# Patient Record
Sex: MALE | Race: WHITE | NOT HISPANIC OR LATINO | Employment: FULL TIME | URBAN - METROPOLITAN AREA
[De-identification: names, ages, dates, MRNs, and addresses within clinical notes are randomized per-mention and may not be internally consistent; named-entity substitution may affect disease eponyms.]

---

## 2017-01-03 ENCOUNTER — TELEPHONE (OUTPATIENT)
Dept: INTERNAL MEDICINE | Facility: CLINIC | Age: 37
End: 2017-01-03

## 2017-01-03 NOTE — TELEPHONE ENCOUNTER
Pt calling b/p readings.  130/90, 124/82, 130/84, 128/83.  Left message for pt that per TGF - all good.  Continue to log b/p readings.

## 2017-01-03 NOTE — TELEPHONE ENCOUNTER
----- Message from Sebastien Shearer sent at 1/3/2017 11:37 AM EST -----  Contact: Leon  BP readings:  130/90, 124/82, 130/84, 126/84 and 128/83.  Leon [phone]

## 2017-02-07 ENCOUNTER — OFFICE VISIT (OUTPATIENT)
Dept: PSYCHIATRY | Facility: CLINIC | Age: 37
End: 2017-02-07

## 2017-02-07 DIAGNOSIS — F43.23 ADJUSTMENT DISORDER WITH MIXED ANXIETY AND DEPRESSED MOOD: Primary | ICD-10-CM

## 2017-02-07 DIAGNOSIS — R45.4 ANGER: ICD-10-CM

## 2017-02-07 PROCEDURE — 90834 PSYTX W PT 45 MINUTES: CPT | Performed by: PSYCHOLOGIST

## 2017-02-07 NOTE — PROGRESS NOTES
Psych Progress Note    Leon Carnes is 37 y.o. male     1. Description of Session    The pt's chief complaints were feelings of anxiety, feeling overwhelmed with responsibilities, and having some resentful feelings towards his mother. He talked about how he struggles to not snap at his kids, but he is doing better. Since the last session he has gotten more one-on-one time with his wife and has gotten additional help from his mother in order to for he and his wife to get a break. He talked about anger/frustrations with his mother who he believes may not have done all she could to help his father stay alive (e.g., pulling the feeding tube too soon). The therapist helped the pt see how unanswered questions about his father's death may be leading him to have anger towards his mother, but talking this out with her at some point may help put some feelings to rest. Also, the therapist helped the pt to see how his mother likely wants to help with the kids more and that he is not likely burdening her to get this help. Role playing was conducted in order to help him see how he can better express himself and help him ask for help more often and in turn, feel less stressed. Perspective taking was taken in order to provide examples of how he can help himself de-stress.     2. Assessment/Diagnostic Impression    The pt presents as anxious, likely due to worrying about things without knowing the proper actions to help himself feel less anxious/overwhelmed. He is making progress as he learns new coping strategies and asks for help more often.     3. Treatment Plan    Continue to provide perspective taking exercises that he can use to help himself de-stress outside of session. Help him notice progress in his life as encouragement and motivation in order to continue working towards a better work-life balance in his life.

## 2017-02-28 ENCOUNTER — OFFICE VISIT (OUTPATIENT)
Dept: PSYCHIATRY | Facility: CLINIC | Age: 37
End: 2017-02-28

## 2017-02-28 DIAGNOSIS — R45.4 ANGER: Primary | ICD-10-CM

## 2017-02-28 DIAGNOSIS — F43.21 ADJUSTMENT DISORDER WITH DEPRESSED MOOD: ICD-10-CM

## 2017-02-28 PROCEDURE — 90834 PSYTX W PT 45 MINUTES: CPT | Performed by: PSYCHOLOGIST

## 2017-03-22 ENCOUNTER — OFFICE VISIT (OUTPATIENT)
Dept: INTERNAL MEDICINE | Facility: CLINIC | Age: 37
End: 2017-03-22

## 2017-03-22 VITALS
TEMPERATURE: 96.8 F | OXYGEN SATURATION: 97 % | HEART RATE: 64 BPM | RESPIRATION RATE: 16 BRPM | SYSTOLIC BLOOD PRESSURE: 130 MMHG | DIASTOLIC BLOOD PRESSURE: 100 MMHG | BODY MASS INDEX: 28.37 KG/M2 | WEIGHT: 215 LBS

## 2017-03-22 DIAGNOSIS — F41.9 ANXIETY: Primary | ICD-10-CM

## 2017-03-22 DIAGNOSIS — R03.0 PREHYPERTENSION: ICD-10-CM

## 2017-03-22 DIAGNOSIS — L29.0 RECTAL ITCHING: ICD-10-CM

## 2017-03-22 DIAGNOSIS — J30.89 PERENNIAL ALLERGIC RHINITIS, UNSPECIFIED ALLERGIC RHINITIS TRIGGER: ICD-10-CM

## 2017-03-22 PROCEDURE — 99213 OFFICE O/P EST LOW 20 MIN: CPT | Performed by: INTERNAL MEDICINE

## 2017-03-22 NOTE — PATIENT INSTRUCTIONS
1.  Consider Anusol HC cream - to treat rectal itchiness.    2.  Consider melatonin 3 mg up to 9 mg at bedtime - to improve sleep quality.  Long-term goal - 7 hours to 8 hours nightly.    3.  Continue routine work with clinical psychologist - to improve stress management.  Consider cognitive behavioral therapy - to improve sleep.    4.  Consider work with registered dietitian - improve nutrition - bring weight down to 200 pounds.    5.  Check blood pressure once weekly or once monthly - on a permanent basis - to monitor changes.    6.  Return in December - annual checkup fasting.

## 2017-03-22 NOTE — PROGRESS NOTES
Cristy Carnes is a 37 y.o. male.     History of Present Illness     The patient's complained of recurring stress and anxiety over the last year.  He has stable employment in a stable family life with his wife and 2 children.  He finds the children often get on his nose and he becomes stressed and at times angry.  He has been working with a clinical psychologist and feels this has helped him to gain more insight and controlling his emotions.  He has not used medication for stress disorders.  His sleep is often broken and inadequate.      The following portions of the patient's history were reviewed and updated as appropriate: allergies, current medications, past family history, past medical history, past social history, past surgical history and problem list.    Review of Systems   Constitutional: Negative for appetite change and fatigue.   Respiratory: Negative for cough and shortness of breath.    Cardiovascular: Negative for chest pain and palpitations.   Gastrointestinal: Negative for abdominal distention, anal bleeding, constipation, diarrhea and nausea.        Intermittent rectal itching for several months   Neurological: Negative for dizziness and light-headedness.   Psychiatric/Behavioral: Positive for sleep disturbance. Negative for dysphoric mood. The patient is nervous/anxious.        Objective   Blood pressure 130/100, pulse 64, temperature 96.8 °F (36 °C), temperature source Oral, resp. rate 16, weight 215 lb (97.5 kg), SpO2 97 %.    Physical Exam   Constitutional: He is oriented to person, place, and time. He appears well-developed and well-nourished. No distress.   Cardiovascular: Normal rate, regular rhythm and normal heart sounds.    Pulmonary/Chest: Effort normal and breath sounds normal. He has no wheezes. He has no rales.   Neurological: He is alert and oriented to person, place, and time. He exhibits normal muscle tone. Coordination normal.   Psychiatric: He has a normal mood and  affect. His behavior is normal. Judgment and thought content normal.   Nursing note and vitals reviewed.    Procedures  Assessment/Plan   Leon was seen today for anxiety.    Diagnoses and all orders for this visit:    Anxiety    Perennial allergic rhinitis, unspecified allergic rhinitis trigger    Rectal itching    Prehypertension     The patient appears to be undergoing an adjustment reaction of young adulthood.  I've asked him to stay engaged with a clinical psychologist.  I've asked him to continue regular physical fitness.    The patient's sleep is somewhat broken likely related to internal stressors.  I've counseled him on considering melatonin as a starting point for treatment.    The patient complains of rectal itching in recent months.  He has talked to her friend who was diagnosed pinworms and prescribed albendazole.  I've encouraged him to finish these 2 pills and assess his progress.  He may need a visit with a proctologist.    The patient's had a history of elevated blood pressures now for the last 3 years.  Blood pressure readings at home have been averaging 130/85.  He is brought in multiple readings in today.  I reminded him about salt restriction, aerobic activity, and weight control.  A goal weight below 200 pounds would  be protective.    Patient Instructions   1.  Consider Anusol HC cream - to treat rectal itchiness.    2.  Consider melatonin 3 mg up to 9 mg at bedtime - to improve sleep quality.  Long-term goal - 7 hours to 8 hours nightly.    3.  Continue routine work with clinical psychologist - to improve stress management.  Consider cognitive behavioral therapy - to improve sleep.    4.  Consider work with registered dietitian - improve nutrition - bring weight down to 200 pounds.    5.  Check blood pressure once weekly or once monthly - on a permanent basis - to monitor changes.    6.  Return in December - annual checkup fasting.      Electronically signed Emeka Laurent M.D.3/24/2017  7:06 AM

## 2017-04-03 NOTE — PROGRESS NOTES
Psych Progress Note    Leon Carnes is 37 y.o. male     1. Description of Session    The pt's chief complaints were feeling stressed, snapping on his kids (then feeling guilty), and feeling somewhat disconnected with his wife. Other areas of his life were reported as going pretty well (e.g., work). Education about the importance of him communicating with his wife regularly one-on-one and being on the same page was provided. The pt was able to note how the two of them need to talk more and share responsibilities. Also, he was able to see how if the two of them are not on the same page, everything else is difficult, if not stressful (and he snaps on the kids). Education (and practice in role play) was provided in order to help the pt connect more with his wife. Love languages were discussed and homework was assigned for him and his wife to discuss their type of love language in order to connect on a deeper level (touch, quality time, acts of service, etc.). He expressed gratitude for today's session.    2. Assessment/Diagnostic Impression    The pt struggles to adjust to his busy life with two kids, keeping up with work, and staying connected with his wife. He tends to take on too many things and needs to share more responsibilities with his wife but only after the two of them spend more time together, connect, and get on the same page with family issues. He blames himself for getting angry and worries that he will be like his father who yelled at the kids. He is making some progress as he is learning better communication skills. He does express sadness over the loss of his father and may be downplaying this more than he first lets on.    3. Treatment Plan    Follow up with assigned homework. Provide grief therapy as needed/appropriate.

## 2018-02-22 ENCOUNTER — TELEPHONE (OUTPATIENT)
Dept: INTERNAL MEDICINE | Facility: CLINIC | Age: 38
End: 2018-02-22

## 2018-02-22 NOTE — TELEPHONE ENCOUNTER
QUINTON PAREDES SAID HE WAS SUPPOSE TO GO TO HIS HEART DOCTOR LAST YEAR FOR JUST A FOLLOW UP  AND IT SLIPPED THRU THE CRACK AND NOW TRYING TO GET TO SEE HIM -HE IS NOW CONSIDERED A NEW PATIENT  AND NEEDS A RE FERAL - NOTHING WRONG AT ALL WAS JUST SUPPOSED TO FOLLOW UP AND DIDN'T     FAMILY HISTORY OF HEART ISSUES (HIS FATHER)     DR NOBLES IS HIS CARDIOLOGIST

## 2018-03-08 ENCOUNTER — OFFICE VISIT (OUTPATIENT)
Dept: INTERNAL MEDICINE | Facility: CLINIC | Age: 38
End: 2018-03-08

## 2018-03-08 ENCOUNTER — APPOINTMENT (OUTPATIENT)
Dept: LAB | Facility: HOSPITAL | Age: 38
End: 2018-03-08

## 2018-03-08 VITALS
RESPIRATION RATE: 16 BRPM | BODY MASS INDEX: 27.57 KG/M2 | SYSTOLIC BLOOD PRESSURE: 136 MMHG | WEIGHT: 208 LBS | DIASTOLIC BLOOD PRESSURE: 94 MMHG | HEART RATE: 48 BPM | TEMPERATURE: 98.4 F | OXYGEN SATURATION: 98 % | HEIGHT: 73 IN

## 2018-03-08 DIAGNOSIS — E78.00 PURE HYPERCHOLESTEROLEMIA: ICD-10-CM

## 2018-03-08 DIAGNOSIS — E55.9 VITAMIN D DEFICIENCY: ICD-10-CM

## 2018-03-08 DIAGNOSIS — R53.82 CHRONIC FATIGUE: ICD-10-CM

## 2018-03-08 DIAGNOSIS — I47.1 PAT (PAROXYSMAL ATRIAL TACHYCARDIA) (HCC): Primary | ICD-10-CM

## 2018-03-08 DIAGNOSIS — F41.9 ANXIETY: ICD-10-CM

## 2018-03-08 DIAGNOSIS — Z23 IMMUNIZATION DUE: ICD-10-CM

## 2018-03-08 DIAGNOSIS — J30.89 PERENNIAL ALLERGIC RHINITIS: ICD-10-CM

## 2018-03-08 DIAGNOSIS — Z00.00 PREVENTATIVE HEALTH CARE: ICD-10-CM

## 2018-03-08 DIAGNOSIS — R03.0 PREHYPERTENSION: ICD-10-CM

## 2018-03-08 PROBLEM — L29.0 RECTAL ITCHING: Status: RESOLVED | Noted: 2017-03-22 | Resolved: 2018-03-08

## 2018-03-08 PROBLEM — E78.5 HLD (HYPERLIPIDEMIA): Status: ACTIVE | Noted: 2018-03-08

## 2018-03-08 LAB
25(OH)D3 SERPL-MCNC: 39.8 NG/ML
ALBUMIN SERPL-MCNC: 5.1 G/DL (ref 3.2–4.8)
ALBUMIN/GLOB SERPL: 1.9 G/DL (ref 1.5–2.5)
ALP SERPL-CCNC: 23 U/L (ref 25–100)
ALT SERPL W P-5'-P-CCNC: 20 U/L (ref 7–40)
ANION GAP SERPL CALCULATED.3IONS-SCNC: 8 MMOL/L (ref 3–11)
ARTICHOKE IGE QN: 141 MG/DL (ref 0–130)
AST SERPL-CCNC: 22 U/L (ref 0–33)
BASOPHILS # BLD AUTO: 0.04 10*3/MM3 (ref 0–0.2)
BASOPHILS NFR BLD AUTO: 0.7 % (ref 0–1)
BILIRUB SERPL-MCNC: 0.9 MG/DL (ref 0.3–1.2)
BILIRUB UR QL STRIP: NEGATIVE
BUN BLD-MCNC: 12 MG/DL (ref 9–23)
BUN/CREAT SERPL: 12 (ref 7–25)
CALCIUM SPEC-SCNC: 9.7 MG/DL (ref 8.7–10.4)
CHLORIDE SERPL-SCNC: 106 MMOL/L (ref 99–109)
CHOLEST SERPL-MCNC: 193 MG/DL (ref 0–200)
CLARITY UR: CLEAR
CO2 SERPL-SCNC: 27 MMOL/L (ref 20–31)
COLOR UR: YELLOW
CREAT BLD-MCNC: 1 MG/DL (ref 0.6–1.3)
CRP SERPL-MCNC: 0.17 MG/DL (ref 0–1)
DEPRECATED RDW RBC AUTO: 39.5 FL (ref 37–54)
EOSINOPHIL # BLD AUTO: 0.11 10*3/MM3 (ref 0–0.3)
EOSINOPHIL NFR BLD AUTO: 1.9 % (ref 0–3)
ERYTHROCYTE [DISTWIDTH] IN BLOOD BY AUTOMATED COUNT: 12.4 % (ref 11.3–14.5)
GFR SERPL CREATININE-BSD FRML MDRD: 85 ML/MIN/1.73
GLOBULIN UR ELPH-MCNC: 2.7 GM/DL
GLUCOSE BLD-MCNC: 72 MG/DL (ref 70–100)
GLUCOSE UR STRIP-MCNC: NEGATIVE MG/DL
HCT VFR BLD AUTO: 45.3 % (ref 38.9–50.9)
HDLC SERPL-MCNC: 67 MG/DL (ref 40–60)
HGB BLD-MCNC: 15.3 G/DL (ref 13.1–17.5)
HGB UR QL STRIP.AUTO: NEGATIVE
IMM GRANULOCYTES # BLD: 0.02 10*3/MM3 (ref 0–0.03)
IMM GRANULOCYTES NFR BLD: 0.3 % (ref 0–0.6)
KETONES UR QL STRIP: ABNORMAL
LEUKOCYTE ESTERASE UR QL STRIP.AUTO: NEGATIVE
LYMPHOCYTES # BLD AUTO: 2.11 10*3/MM3 (ref 0.6–4.8)
LYMPHOCYTES NFR BLD AUTO: 36.2 % (ref 24–44)
MCH RBC QN AUTO: 29.5 PG (ref 27–31)
MCHC RBC AUTO-ENTMCNC: 33.8 G/DL (ref 32–36)
MCV RBC AUTO: 87.5 FL (ref 80–99)
MONOCYTES # BLD AUTO: 0.42 10*3/MM3 (ref 0–1)
MONOCYTES NFR BLD AUTO: 7.2 % (ref 0–12)
NEUTROPHILS # BLD AUTO: 3.13 10*3/MM3 (ref 1.5–8.3)
NEUTROPHILS NFR BLD AUTO: 53.7 % (ref 41–71)
NITRITE UR QL STRIP: NEGATIVE
PH UR STRIP.AUTO: <=5 [PH] (ref 5–8)
PLATELET # BLD AUTO: 196 10*3/MM3 (ref 150–450)
PMV BLD AUTO: 10.6 FL (ref 6–12)
POTASSIUM BLD-SCNC: 4.1 MMOL/L (ref 3.5–5.5)
PROT SERPL-MCNC: 7.8 G/DL (ref 5.7–8.2)
PROT UR QL STRIP: NEGATIVE
RBC # BLD AUTO: 5.18 10*6/MM3 (ref 4.2–5.76)
SODIUM BLD-SCNC: 141 MMOL/L (ref 132–146)
SP GR UR STRIP: 1.01 (ref 1–1.03)
TRIGL SERPL-MCNC: 62 MG/DL (ref 0–150)
TSH SERPL DL<=0.05 MIU/L-ACNC: 1.25 MIU/ML (ref 0.35–5.35)
UROBILINOGEN UR QL STRIP: ABNORMAL
WBC NRBC COR # BLD: 5.83 10*3/MM3 (ref 3.5–10.8)

## 2018-03-08 PROCEDURE — 90715 TDAP VACCINE 7 YRS/> IM: CPT | Performed by: INTERNAL MEDICINE

## 2018-03-08 PROCEDURE — 99395 PREV VISIT EST AGE 18-39: CPT | Performed by: INTERNAL MEDICINE

## 2018-03-08 PROCEDURE — 84443 ASSAY THYROID STIM HORMONE: CPT | Performed by: INTERNAL MEDICINE

## 2018-03-08 PROCEDURE — 86140 C-REACTIVE PROTEIN: CPT | Performed by: INTERNAL MEDICINE

## 2018-03-08 PROCEDURE — 80061 LIPID PANEL: CPT | Performed by: INTERNAL MEDICINE

## 2018-03-08 PROCEDURE — 80053 COMPREHEN METABOLIC PANEL: CPT | Performed by: INTERNAL MEDICINE

## 2018-03-08 PROCEDURE — 82306 VITAMIN D 25 HYDROXY: CPT | Performed by: INTERNAL MEDICINE

## 2018-03-08 PROCEDURE — 85025 COMPLETE CBC W/AUTO DIFF WBC: CPT | Performed by: INTERNAL MEDICINE

## 2018-03-08 PROCEDURE — 36415 COLL VENOUS BLD VENIPUNCTURE: CPT | Performed by: INTERNAL MEDICINE

## 2018-03-08 PROCEDURE — 99214 OFFICE O/P EST MOD 30 MIN: CPT | Performed by: INTERNAL MEDICINE

## 2018-03-08 PROCEDURE — 93000 ELECTROCARDIOGRAM COMPLETE: CPT | Performed by: INTERNAL MEDICINE

## 2018-03-08 PROCEDURE — 81003 URINALYSIS AUTO W/O SCOPE: CPT | Performed by: INTERNAL MEDICINE

## 2018-03-08 PROCEDURE — 90471 IMMUNIZATION ADMIN: CPT | Performed by: INTERNAL MEDICINE

## 2018-03-08 RX ORDER — TRETINOIN 0.5 MG/G
1 CREAM TOPICAL DAILY PRN
Refills: 2 | COMMUNITY
Start: 2018-02-25 | End: 2019-01-19

## 2018-03-08 NOTE — PATIENT INSTRUCTIONS
1.  Continue usual medicines and supplements - as listed.    2.  Follow low calorie American Heart Association diet - low in salt and low in sugar.    3.  Routinely keep sodium intake - no more than - 2000 mg daily.    4.  Maintain routine physical fitness program - every week.    5.  Appointment with cardiologist - reassess heart rhythm status.    6.  Plan appointment with counselor - develop stress management techniques.    7.  Return visit one year - annual checkup fasting.

## 2018-03-08 NOTE — PROGRESS NOTES
Cristy Carnes is a 38 y.o. male.     Chief Complaint   Patient presents with   • Annual Exam   • Anxiety       History of Present Illness     The patient has noticed several years of increasing difficulty controlling his temper at home.  He is very aware when this occurs with frustration with his small children.  He is getting along  well at work and with his wife.  He did work with a clinical psychologist last year but found this minimally helpful.  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  He feels his work performance has been stable in the last year.  He is generally sleeping well at night.  He has had a more disciplined exercise program in recent months and finds this helps with stress management.  He has not been on medications for stress management.     The following portions of the patient's history were reviewed and updated as appropriate: allergies, current medications, past family history, past medical history, past social history, past surgical history and problem list.    Active Ambulatory Problems     Diagnosis Date Noted   • Preventative health care 2016   • Anxiety 2016   • Perennial allergic rhinitis 2016   • PAT (paroxysmal atrial tachycardia) 2016   • Chronic fatigue 2016   • Prehypertension 2017   • Vitamin D deficiency 2018   • HLD (hyperlipidemia) 2018     Resolved Ambulatory Problems     Diagnosis Date Noted   • Rectal itching 2017     Past Medical History:   Diagnosis Date   • Allergic rhinitis Lifelong   • Anxiety    • AV yudi re-entry tachycardia    • Closed left ankle fracture    • Hyperlipidemia    • Prehypertension      Past Surgical History:   Procedure Laterality Date   • RADIOFREQUENCY ABLATION      AV nod tach     Family History   Problem Relation Age of Onset   • No Known Problems Mother    • Hypertrophic cardiomyopathy Father       age 72   • Hypertension Father       Social History     Social History   • Marital status:      Spouse name: N/A   • Number of children: N/A   • Years of education: N/A     Occupational History   • Not on file.     Social History Main Topics   • Smoking status: Never Smoker   • Smokeless tobacco: Never Used   • Alcohol use 1.2 oz/week     2 Glasses of wine per week   • Drug use: No   • Sexual activity: Not on file     Other Topics Concern   • Not on file     Social History Narrative    Domestic life : Lives in private home with wife and 2 children        Sabianist : [employer]        Sleep hygiene : In bed 10 PM to 6 AM for 8 hours of sleep        Caffeine use : 1 or 2 cups of coffee daily        Exercise habits : Jogs 3 miles 4 days weekly.  200 pushups 3 days weekly.          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   HENT: Positive for congestion and sneezing. Negative for ear pain and sore throat.         Allergic rhinitis well controlled on nightly Zyrtec   Eyes: Negative for itching and visual disturbance.   Respiratory: Negative for cough and shortness of breath.    Cardiovascular: Negative for chest pain and palpitations.   Gastrointestinal: Negative for abdominal pain and nausea.   Endocrine: Negative for cold intolerance and heat intolerance.   Genitourinary: Negative for dysuria and hematuria.   Musculoskeletal: Negative for arthralgias and back pain.   Skin: Negative for rash and wound.   Allergic/Immunologic: Negative for environmental allergies and food allergies.   Neurological: Negative for dizziness and headaches.   Hematological: Negative for adenopathy. Does not bruise/bleed easily.   Psychiatric/Behavioral: Negative for sleep  "disturbance. The patient is nervous/anxious.         Seems well on Zyrtec.  Recurring episodes of anxiety and distress at home.  Functions well at work.       Objective   Blood pressure 136/94, pulse (!) 48, temperature 98.4 °F (36.9 °C), temperature source Oral, resp. rate 16, height 185.4 cm (73\"), weight 94.3 kg (208 lb), SpO2 98 %.    Physical Exam   Constitutional: He is oriented to person, place, and time. He appears well-developed and well-nourished. No distress.   HENT:   Right Ear: External ear normal.   Left Ear: External ear normal.   Nose: Nose normal.   Mouth/Throat: Oropharynx is clear and moist.   Eyes: EOM are normal. Pupils are equal, round, and reactive to light. No scleral icterus.   Neck: Normal range of motion. Neck supple. No JVD present. No thyromegaly present.   Cardiovascular: Normal rate, regular rhythm, normal heart sounds and intact distal pulses.    No murmur heard.  Pulmonary/Chest: Effort normal and breath sounds normal. He has no wheezes. He has no rales.   Abdominal: Soft. Bowel sounds are normal. He exhibits no distension and no mass. There is no tenderness.   Genitourinary:   Genitourinary Comments: Deferred   Musculoskeletal: Normal range of motion. He exhibits no edema or tenderness.   Lymphadenopathy:     He has no cervical adenopathy.   Neurological: He is alert and oriented to person, place, and time. He displays normal reflexes. No cranial nerve deficit. He exhibits normal muscle tone. Coordination normal.   Vibratory normal  Romberg negative  Gait normal  Plantars downgoing     Skin: Skin is warm and dry. No rash noted.   Psychiatric: He has a normal mood and affect. His behavior is normal. Judgment and thought content normal.   Nursing note and vitals reviewed.      ECG 12 Lead  Date/Time: 3/8/2018 3:30 PM  Performed by: EMEKA LAURENT  Authorized by: EMEKA LAURENT   Interpreted by ED physician: Emeka Laurent M.D.  Comparison: compared with previous ECG from " 12/6/2016  Similar to previous ECG  Rhythm: sinus rhythm  Rate: bradycardic  BPM: 45  Conduction: conduction normal  ST Segments: ST segments normal  T Waves: T waves normal  QRS axis: normal  Other findings: early repolarization  Clinical impression: non-specific ECG  Comments: Indication - history PAT  Heart rate decreased 10 bpm  Otherwise baseline EKG          Assessment/Plan   Leon was seen today for annual exam and anxiety.    Diagnoses and all orders for this visit:    PAT (paroxysmal atrial tachycardia)  -     ECG 12 Lead  -     Ambulatory Referral to Cardiology    Prehypertension  -     Urinalysis With / Microscopic If Indicated - Urine, Clean Catch  -     Ambulatory Referral to Cardiology    Perennial allergic rhinitis    Anxiety  -     TSH    Chronic fatigue  -     CBC & Differential  -     C-reactive Protein  -     CBC Auto Differential    Preventative health care    Vitamin D deficiency  -     Vitamin D 25 Hydroxy    Pure hypercholesterolemia  -     Comprehensive Metabolic Panel  -     Lipid Panel    Immunization due  -     Tdap Vaccine Greater Than or Equal To 8yo IM      The patient has a persistent stress disorder for many years.  It is likely related to his intense job.  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  He should work on more disciplined methods for controlling temper and dealing effectively with his children.  Building skills with interpersonal behavior will make she more effective in his career and other aspects of his life.    The patient has moderate hyperlipidemia with an LDL slightly increased from 134-141 in the last year.  There is questionable benefit for this patient over the next 5 years from statin therapy.  He has never been a smoker.  He and his wife should work more intensively on a prudent diet, regular aerobic activity, and weight management.    The patient underwent ablation therapy at  age 21 for AV yudi tachycardia.  He has had no recent symptoms.  I've asked him to undergo new evaluation with a cardiologist.    The patient has prediabetic pretension and has blood pressures regularly and 125 systolic range at home while at rest.  Salt restriction, aerobic activity, and improved weight control are his best current treatment techniques.    The preventive exam has been reviewed in detail.  The patient has been fully counseled on preventative guidelines for vaccines, cancer screenings, and other health maintenance needs.   The patient has been counseled on guidelines for maintaining a lifestyle to promote good health and to minimize chronic diseases.  The patient has been assisted with scheduling these healthcare procedures for the coming year and given a written document outlining these recommendations.    Patient Instructions   1.  Continue usual medicines and supplements - as listed.    2.  Follow low calorie American Heart Association diet - low in salt and low in sugar.    3.  Routinely keep sodium intake - no more than - 2000 mg daily.    4.  Maintain routine physical fitness program - every week.    5.  Appointment with cardiologist - reassess heart rhythm status.    6.  Plan appointment with counselor - develop stress management techniques.    7.  Return visit one year - annual checkup fasting.    8.  LDL cholesterol mildly elevated 141.  Follow American Heart Association guidelines.    9.  No carbohydrate Mediterranean style diet is optimal.  Control calories for one year goal weight 196.    10.  Other laboratory tests are acceptable and require no change in treatment.    11.  Ophthalmology examination this year.    Current Outpatient Prescriptions:   •  cetirizine (zyrTEC) 10 MG tablet, Take 10 mg by mouth As Needed., Disp: , Rfl:   •  cholecalciferol (VITAMIN D3) 1000 UNITS tablet, Take 1,000 Units by mouth Daily., Disp: , Rfl:   •  fluticasone (FLONASE) 50 MCG/ACT nasal spray, 1 spray  into each nostril Daily As Needed., Disp: , Rfl:   •  Multiple Vitamins-Minerals (CENTRUM ADULTS PO), Take 1 tablet by mouth Daily., Disp: , Rfl:   •  tretinoin (RETIN-A) 0.05 % cream, Apply 1 application topically Daily As Needed., Disp: , Rfl: 2    No Known Allergies    Immunization History   Administered Date(s) Administered   • Influenza, Quadrivalent 10/01/2017   • Tdap 03/08/2018   • influenza Split 12/06/2016     Electronically signed Emeka Laurent M.D.3/8/2018 5:57 PM

## 2018-03-10 ENCOUNTER — DOCUMENTATION (OUTPATIENT)
Dept: INTERNAL MEDICINE | Facility: CLINIC | Age: 38
End: 2018-03-10

## 2018-03-10 NOTE — PROGRESS NOTES
Telephone call placed to patient on Saturday, March 10.  Results of recent examination were discussed.  Patient referred to Coy Millan PhD for counseling on stress management.  He will benefit from several sessions to develop improved anger control skills.  He appears to understand and will follow through.

## 2018-04-24 ENCOUNTER — CONSULT (OUTPATIENT)
Dept: CARDIOLOGY | Facility: CLINIC | Age: 38
End: 2018-04-24

## 2018-04-24 VITALS
SYSTOLIC BLOOD PRESSURE: 122 MMHG | HEIGHT: 74 IN | HEART RATE: 42 BPM | DIASTOLIC BLOOD PRESSURE: 74 MMHG | WEIGHT: 204 LBS | BODY MASS INDEX: 26.18 KG/M2

## 2018-04-24 DIAGNOSIS — I47.1 SVT (SUPRAVENTRICULAR TACHYCARDIA) (HCC): ICD-10-CM

## 2018-04-24 DIAGNOSIS — Z82.49 FAMILY HISTORY OF HYPERTROPHIC CARDIOMYOPATHY: Primary | ICD-10-CM

## 2018-04-24 PROCEDURE — 93000 ELECTROCARDIOGRAM COMPLETE: CPT | Performed by: INTERNAL MEDICINE

## 2018-04-24 PROCEDURE — 99244 OFF/OP CNSLTJ NEW/EST MOD 40: CPT | Performed by: INTERNAL MEDICINE

## 2018-04-24 NOTE — ASSESSMENT & PLAN NOTE
· The patient has no symptoms of angina, heart failure, or syncope.  · Previous evaluation with echo showed structurally normal heart.  · Repeat echocardiogram in 2020

## 2018-04-24 NOTE — PROGRESS NOTES
Encounter Date:04/24/2018    Patient ID: Leon Carnes is a 38 y.o. male who resides in [place], [place].    CC/Reason for visit:  Prehypertensive (Consult )            Leon Carnes is referred back to my office to establish care. 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 Additionally, his father was diagnosed with hypertrophic cardiomyopathy. The patient has no cardiovascular symptoms at present. He remains physically active running several miles 3 days a week. He is recently been attempting to lose weight with exercise and diet and has lost 20 pounds in the last year. He denies palpitations, syncope, exertional chest pain, or shortness of breath. His last cardiac evaluation was in 2014 with echocardiogram which was normal. He did have a cardiac MRI in 2011 which was also unremarkable.    Review of Systems   Constitution: Negative for weakness and malaise/fatigue.   Eyes: Negative for vision loss in left eye and vision loss in right eye.   Cardiovascular: Negative for chest pain, dyspnea on exertion, near-syncope, orthopnea, palpitations, paroxysmal nocturnal dyspnea and syncope.   Musculoskeletal: Negative for myalgias.   Neurological: Negative for brief paralysis, excessive daytime sleepiness, focal weakness, numbness and paresthesias.   All other systems reviewed and are negative.      The patient's past medical, social, family history and ROS reviewed in the patient's electronic medical record.    Allergies  Review of patient's allergies indicates no known allergies.    Outpatient Prescriptions Marked as Taking for the 4/24/18 encounter (Consult) with Filiberto Miner IV, MD   Medication Sig Dispense Refill   • cetirizine (zyrTEC) 10 MG tablet Take 10 mg by mouth As Needed.     • fluticasone (FLONASE) 50 MCG/ACT nasal spray 1 spray into each nostril Daily As Needed.     • Multiple Vitamins-Minerals (CENTRUM  "ADULTS PO) Take 1 tablet by mouth Daily.     • tretinoin (RETIN-A) 0.05 % cream Apply 1 application topically Daily As Needed.  2         Blood pressure 122/74, pulse (!) 42, height 188 cm (74\"), weight 92.5 kg (204 lb).  Body mass index is 26.19 kg/m².    Physical Exam   Constitutional: He is oriented to person, place, and time. He appears well-developed and well-nourished.   HENT:   Head: Normocephalic and atraumatic.   Eyes: Pupils are equal, round, and reactive to light. No scleral icterus.   Neck: No JVD present. Carotid bruit is not present. No thyromegaly present.   Cardiovascular: Normal rate and regular rhythm.  Exam reveals no gallop.    No murmur heard.  Pulmonary/Chest: Effort normal and breath sounds normal.   Abdominal: Soft. He exhibits no distension. There is no hepatosplenomegaly.   Musculoskeletal: He exhibits no edema.   Neurological: He is alert and oriented to person, place, and time.   Skin: Skin is warm and dry.   Psychiatric: He has a normal mood and affect. His behavior is normal.       Data Review:     Lab Results   Component Value Date    TSH 1.246 03/08/2018     Lab Results   Component Value Date    CHOL 193 03/08/2018    TRIG 62 03/08/2018    HDL 67 (H) 03/08/2018    AST 22 03/08/2018    ALT 20 03/08/2018     Lab Results   Component Value Date    GLUCOSE 72 03/08/2018    BUN 12 03/08/2018    CREATININE 1.00 03/08/2018    EGFRIFNONA 85 03/08/2018    BCR 12.0 03/08/2018    K 4.1 03/08/2018    CO2 27.0 03/08/2018    CALCIUM 9.7 03/08/2018    ALBUMIN 5.10 (H) 03/08/2018    LABIL2 1.9 03/08/2018    AST 22 03/08/2018    ALT 20 03/08/2018     Lab Results   Component Value Date    WBC 5.83 03/08/2018    HGB 15.3 03/08/2018    HCT 45.3 03/08/2018    MCV 87.5 03/08/2018     03/08/2018       ECG 12 Lead  Date/Time: 4/24/2018 9:29 AM  Performed by: CRAIG NOBLES IV  Authorized by: CRAIG NOBLES IV   Rhythm: sinus bradycardia  BPM: 42  Clinical impression: abnormal " ECG               Problem List Items Addressed This Visit        Cardiovascular and Mediastinum    History of SVT     Overview     · History of AV node reentrant tachycardia status post radiofrequency ablation by Mahendra Jean, May 2001         Current Assessment & Plan     · The patient remains asymptomatic for arrhythmia            Other    Family history of hypertrophic cardiomyopathy - Primary    Overview     · Father with hypertrophic cardiomyopathy  · Echocardiogram  (2008):  Mild LVH, trace MR, EF greater than 60%.  · Cardiac MRI (2011):  Intraventricular septal thickness, 11 mm, within normal limits.  · Echo (2/17/14): LVEF normal with normal diastolic parameters. Normal functioning valves. No evidence of hypertrophic cardiomyopathy         Current Assessment & Plan     · The patient has no symptoms of angina, heart failure, or syncope.  · Previous evaluation with echo showed structurally normal heart.  · Repeat echocardiogram in 2020           Other Visit Diagnoses    None.       38-year-old gentleman who is asymptomatic for cardiovascular complaints of angina, heart failure, palpitations, or syncope. He has been maintaining an active lifestyle for which I congratulated him. I do not recommend any tests at this time. A repeat echocardiogram will be considered in 2020.       · Return to clinic in one year  · Echo in 2020    Filiberto Miner IV, MD  4/24/2018

## 2018-08-27 ENCOUNTER — OFFICE VISIT (OUTPATIENT)
Dept: INTERNAL MEDICINE | Facility: CLINIC | Age: 38
End: 2018-08-27

## 2018-08-27 VITALS
HEIGHT: 74 IN | HEART RATE: 52 BPM | SYSTOLIC BLOOD PRESSURE: 142 MMHG | OXYGEN SATURATION: 99 % | WEIGHT: 198.4 LBS | BODY MASS INDEX: 25.46 KG/M2 | DIASTOLIC BLOOD PRESSURE: 82 MMHG | TEMPERATURE: 98.4 F

## 2018-08-27 DIAGNOSIS — F41.9 ANXIETY: Primary | ICD-10-CM

## 2018-08-27 DIAGNOSIS — M76.01 GLUTEAL TENDINITIS, RIGHT HIP: ICD-10-CM

## 2018-08-27 PROCEDURE — 99214 OFFICE O/P EST MOD 30 MIN: CPT | Performed by: FAMILY MEDICINE

## 2018-08-27 RX ORDER — BUPROPION HYDROCHLORIDE 150 MG/1
150 TABLET ORAL EVERY MORNING
Qty: 30 TABLET | Refills: 0 | Status: SHIPPED | OUTPATIENT
Start: 2018-08-27 | End: 2018-08-27 | Stop reason: SDUPTHER

## 2018-08-27 RX ORDER — BUPROPION HYDROCHLORIDE 150 MG/1
150 TABLET ORAL EVERY MORNING
Qty: 30 TABLET | Refills: 0 | Status: SHIPPED | OUTPATIENT
Start: 2018-08-27 | End: 2018-09-13 | Stop reason: DRUGHIGH

## 2018-08-27 NOTE — PATIENT INSTRUCTIONS
1.  Start taking the Wellbutrin    2.  Let me know in 2 weeks if you're tolerating this medication.  You may experience some initial side effects as the medication reaches its steady state, see iodine.com for a few of the typical timeline.    3.  If you're doing well in 2 weeks, we will plan to increase to the therapeutic dosage of 300 mg daily.    4.  Use Aleve twice daily for the next few days and as needed for your right hip.  You may also use the topical Flector patch, particularly at night.    5.  Try to focus on some of the stretches and strengthening exercises provided.  Try to shorten your strides when running.  If these measures are not effective, I would recommend an x-ray at your next visit.    6.  Follow-up in 6 weeks

## 2018-08-27 NOTE — PROGRESS NOTES
Cristy Carnes is a 38 y.o. male.     Chief Complaint   Patient presents with   • Anxiety     short temper,  irritable    • Hip Pain     SX 3 weeks        Anxiety   Symptoms include nervous/anxious behavior. Patient reports no chest pain, dizziness, nausea, palpitations or shortness of breath.       Hip Pain        The patient has noticed several years of increasing difficulty controlling his temper at home.  He is very aware when this occurs with frustration with his small children.  He is getting along well at work and with his wife.  He did work with a clinical psychologist last year but found this minimally helpful.  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  He feels his work performance has been stable in the last year.  He is generally sleeping well at night.  He has had a more disciplined exercise program in recent months and finds this helps with stress management.  He has not been on medications for stress management.  His wife is a nurse practitioner at [facility] [employer], she takes sertraline for her anxiety.    He generally has struggled with almonds of depression and anxiety ever since the death of his father as well as the overdose death of his brother-in-law.  He has previously used running as a stress reliever, however over the past year he attempted to by a new pair of running shoes, ran in the [employer] one time and noted some significant right hip pain.  This has persisted even after getting a new pair of asics.  It mostly bothers him into his runs, loosens up somewhat.  Also bothers with stairs, getting into or out of cars, and if he rolls over onto it at night.  He has had previous IT band issues in his knee.  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    The following portions of the patient's history were reviewed and updated as appropriate: allergies, current medications, past family history, past medical history, past social history, past surgical history  and problem list.    Active Ambulatory Problems     Diagnosis Date Noted   • Anxiety 2016   • Perennial allergic rhinitis 2016   • History of SVT  2016   • Prehypertension 2017   • Vitamin D deficiency 2018   • HLD (hyperlipidemia) 2018   • Family history of hypertrophic cardiomyopathy 2018     Resolved Ambulatory Problems     Diagnosis Date Noted   • Preventative health care 2016   • Chronic fatigue 2016   • Rectal itching 2017     Past Medical History:   Diagnosis Date   • Allergic rhinitis Lifelong   • Anxiety    • AV yudi re-entry tachycardia (CMS/HCC)    • Closed left ankle fracture    • Hyperlipidemia    • Prehypertension      Past Surgical History:   Procedure Laterality Date   • RADIOFREQUENCY ABLATION      AV nod tach     Family History   Problem Relation Age of Onset   • No Known Problems Mother    • Hypertrophic cardiomyopathy Father          age 72   • Hypertension Father      Social History     Social History   • Marital status:      Spouse name: N/A   • Number of children: N/A   • Years of education: N/A     Occupational History   • Not on file.     Social History Main Topics   • Smoking status: Never Smoker   • Smokeless tobacco: Never Used   • Alcohol use 1.2 oz/week     2 Glasses of wine per week   • Drug use: No   • Sexual activity: Yes     Partners: Female     Other Topics Concern   • Not on file     Social History Narrative    Domestic life : Lives in private home with wife and 2 children        Yarsanism : Episcopal        Sleep hygiene : In bed 10 PM to 6 AM for 8 hours of sleep        Caffeine use : 1 or 2 cups of coffee daily        Exercise habits : Jogs 3 miles 4 days weekly.  200 pushups 3 days weekly.          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   HENT: Positive for congestion and sneezing. Negative for ear pain and sore throat.         Allergic rhinitis well controlled on nightly Zyrtec   Eyes: Negative for itching and visual disturbance.   Respiratory: Negative for cough and shortness of breath.    Cardiovascular: Negative for chest pain and palpitations.   Gastrointestinal: Negative for abdominal pain and nausea.   Endocrine: Negative for cold intolerance and heat intolerance.   Genitourinary: Negative for dysuria and hematuria.   Musculoskeletal: Negative for arthralgias and back pain.   Skin: Negative for rash and wound.   Allergic/Immunologic: Negative for environmental allergies and food allergies.   Neurological: Negative for dizziness and headaches.   Hematological: Negative for adenopathy. Does not bruise/bleed easily.   Psychiatric/Behavioral: Negative for sleep disturbance. The patient is nervous/anxious.         Seems well on Zyrtec.  Recurring episodes of anxiety and distress at home.  Functions well at work.       Objective   Blood pressure 142/82, pulse 52, temperature 98.4 °F (36.9 °C), temperature source Temporal Artery , height 188 cm (74\"), weight 90 kg (198 lb 6.4 oz), SpO2 99 %.    Physical Exam   Constitutional: He is oriented to person, place, and time. He appears well-developed and well-nourished. No distress.   HENT:   Right Ear: External ear normal.   Left Ear: External ear normal.   Nose: Nose normal.   Mouth/Throat: Oropharynx is clear and moist.   Eyes: Pupils are equal, round, and reactive to light. EOM are normal. No scleral icterus.   Neck: Normal range of motion. Neck supple. No JVD present. No thyromegaly present.   Cardiovascular: Normal rate, regular rhythm, normal heart sounds and intact distal pulses.    No murmur heard.  Pulmonary/Chest: Effort normal and " breath sounds normal. He has no wheezes. He has no rales.   Abdominal: Soft. Bowel sounds are normal. He exhibits no distension and no mass. There is no tenderness.   Genitourinary:   Genitourinary Comments: Deferred   Musculoskeletal: Normal range of motion. He exhibits no edema or tenderness.   Lymphadenopathy:     He has no cervical adenopathy.   Neurological: He is alert and oriented to person, place, and time. He displays normal reflexes. No cranial nerve deficit. He exhibits normal muscle tone. Coordination normal.   Vibratory normal  Romberg negative  Gait normal  Plantars downgoing     Skin: Skin is warm and dry. No rash noted.   Psychiatric: He has a normal mood and affect. His behavior is normal. Judgment and thought content normal.   Nursing note and vitals reviewed.    Procedures  Assessment/Plan   Leon was seen today for anxiety and hip pain.    Diagnoses and all orders for this visit:    Anxiety  -     Discontinue: sertraline (ZOLOFT) 50 MG tablet; Take 1 tablet by mouth Daily for 14 days.  -     buPROPion XL (WELLBUTRIN XL) 150 MG 24 hr tablet; Take 1 tablet by mouth Every Morning.    Gluteal tendinitis, right hip      The patient has a persistent stress disorder for many years.  It is likely related to his intense job.  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  He should work on more disciplined methods for controlling temper and dealing effectively with his children.  Continued cognitive behavioral therapy is recommended, however structured visits with a clinical psychologist seem to add more stress than they relieve given his busy schedule.  I have recommended pharmacological management.  After discussion, I feel he would do best on Wellbutrin given concern for sexual side effects as well as his history.    His right hip pain is fairly classical for gluteal medius/minimus tendinitis as well as some trochanteric  bursitis.  We discussed at home management strategies, stretching, relevant anatomy for his clinical diagnosis.  See instructions below.  If he desires a cortisone shot for the bursitis in the future this would be reasonable, however if his pain continues at 6 weeks, I would consider further imaging including an x-ray to evaluate for femoral neck stress fracture, as well as musculoskeletal ultrasound to evaluate the integrity of the gluteal tendons.    The patient has moderate hyperlipidemia with an LDL slightly increased from 134-141 in the last year.  There is questionable benefit for this patient over the next 5 years from statin therapy.  He has never been a smoker.  He and his wife should work more intensively on a prudent diet, regular aerobic activity, and weight management.    The patient underwent ablation therapy at age 21 for AV yudi tachycardia.  He has had no recent symptoms.  He recently underwent evaluation with cardiologist.    The patient has prehypertension and has blood pressures regularly and 125 systolic range at home while at rest.  Salt restriction, aerobic activity, and improved weight control are his best current treatment techniques.    Patient Instructions   1.  Start taking the Wellbutrin    2.  Let me know in 2 weeks if you're tolerating this medication.  You may experience some initial side effects as the medication reaches its steady state, see iodine.com for a few of the typical timeline.    3.  If you're doing well in 2 weeks, we will plan to increase to the therapeutic dosage of 300 mg daily.    4.  Use Aleve twice daily for the next few days and as needed for your right hip.  You may also use the topical Flector patch, particularly at night.    5.  Try to focus on some of the stretches and strengthening exercises provided.  Try to shorten your strides when running.  If these measures are not effective, I would recommend an x-ray at your next visit.    6.  Follow-up in 6  weeks    8.  LDL cholesterol mildly elevated 141.  Follow American Heart Association guidelines.    9.  No carbohydrate Mediterranean style diet is optimal.  Control calories for one year goal weight 196.    10.  Other laboratory tests are acceptable and require no change in treatment.    11.  Ophthalmology examination this year.    Current Outpatient Prescriptions:   •  cetirizine (zyrTEC) 10 MG tablet, Take 10 mg by mouth As Needed., Disp: , Rfl:   •  fluticasone (FLONASE) 50 MCG/ACT nasal spray, 1 spray into each nostril Daily As Needed., Disp: , Rfl:   •  Multiple Vitamins-Minerals (CENTRUM ADULTS PO), Take 1 tablet by mouth Daily., Disp: , Rfl:   •  tretinoin (RETIN-A) 0.05 % cream, Apply 1 application topically Daily As Needed., Disp: , Rfl: 2  •  buPROPion XL (WELLBUTRIN XL) 150 MG 24 hr tablet, Take 1 tablet by mouth Every Morning., Disp: 30 tablet, Rfl: 0    No Known Allergies    Immunization History   Administered Date(s) Administered   • Influenza, Quadrivalent 10/01/2017   • Tdap 03/08/2018   • influenza Split 12/06/2016     Electronically signed Emeka Laurent M.D.8/27/2018 12:38 PM

## 2018-10-08 ENCOUNTER — OFFICE VISIT (OUTPATIENT)
Dept: FAMILY MEDICINE CLINIC | Facility: CLINIC | Age: 38
End: 2018-10-08

## 2018-10-08 VITALS
HEIGHT: 74 IN | SYSTOLIC BLOOD PRESSURE: 130 MMHG | DIASTOLIC BLOOD PRESSURE: 80 MMHG | WEIGHT: 201.3 LBS | HEART RATE: 74 BPM | BODY MASS INDEX: 25.83 KG/M2 | OXYGEN SATURATION: 99 %

## 2018-10-08 DIAGNOSIS — F41.9 ANXIETY: Primary | ICD-10-CM

## 2018-10-08 DIAGNOSIS — Z23 NEED FOR INFLUENZA VACCINATION: ICD-10-CM

## 2018-10-08 PROCEDURE — 90471 IMMUNIZATION ADMIN: CPT | Performed by: FAMILY MEDICINE

## 2018-10-08 PROCEDURE — 99212 OFFICE O/P EST SF 10 MIN: CPT | Performed by: FAMILY MEDICINE

## 2018-10-08 PROCEDURE — 90686 IIV4 VACC NO PRSV 0.5 ML IM: CPT | Performed by: FAMILY MEDICINE

## 2018-10-08 NOTE — PATIENT INSTRUCTIONS
1. If slow or no improvement over the next couple of weeks, let me know and we can adjust/switch.    2. Consider more individual therapy.

## 2018-10-08 NOTE — PROGRESS NOTES
Cristy Carnes is a 38 y.o. male.     Chief Complaint   Patient presents with   • Follow-up       Anxiety   Symptoms include nervous/anxious behavior. Patient reports no chest pain, dizziness, nausea, palpitations or shortness of breath.       Hip Pain        See previous note for history.  He was started on Wellbutrin in August for depression and anxiety.  He reports significant improvement with this medication.  His wife has noticed a difference.  Also beneficial is that his sleep has improved and he has been trying to find a more disciplined exercise program and stress management mechanisms.    The following portions of the patient's history were reviewed and updated as appropriate: allergies, current medications, past family history, past medical history, past social history, past surgical history and problem list.    Active Ambulatory Problems     Diagnosis Date Noted   • Anxiety 2016   • Perennial allergic rhinitis 2016   • History of SVT  2016   • Prehypertension 2017   • Vitamin D deficiency 2018   • HLD (hyperlipidemia) 2018   • Family history of hypertrophic cardiomyopathy 2018     Resolved Ambulatory Problems     Diagnosis Date Noted   • Preventative health care 2016   • Chronic fatigue 2016   • Rectal itching 2017     Past Medical History:   Diagnosis Date   • Allergic rhinitis Lifelong   • Anxiety    • AV yudi re-entry tachycardia (CMS/HCC)    • Closed left ankle fracture    • Hyperlipidemia    • Prehypertension      Past Surgical History:   Procedure Laterality Date   • RADIOFREQUENCY ABLATION      AV nod tach     Family History   Problem Relation Age of Onset   • No Known Problems Mother    • Hypertrophic cardiomyopathy Father          age 72   • Hypertension Father      Social History     Social History   • Marital status:      Spouse name: N/A   • Number of children: N/A   • Years of  education: N/A     Occupational History   • Not on file.     Social History Main Topics   • Smoking status: Never Smoker   • Smokeless tobacco: Never Used   • Alcohol use 1.2 oz/week     2 Glasses of wine per week   • Drug use: No   • Sexual activity: Yes     Partners: Female     Other Topics Concern   • Not on file     Social History Narrative    Domestic life : Lives in private home with wife and 2 children        Judaism : Mu-ism        Sleep hygiene : In bed 10 PM to 6 AM for 8 hours of sleep        Caffeine use : 1 or 2 cups of coffee daily        Exercise habits : Jogs 3 miles 4 days weekly.  200 pushups 3 days weekly.          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   HENT: Negative for ear pain and sore throat.         Allergic rhinitis well controlled on nightly Zyrtec   Eyes: Negative for itching and visual disturbance.   Respiratory: Negative for cough and shortness of breath.    Cardiovascular: Negative for chest pain and palpitations.   Gastrointestinal: Negative for abdominal pain and nausea.   Endocrine: Negative for cold intolerance and heat intolerance.   Genitourinary: Negative for dysuria and hematuria.   Musculoskeletal: Negative for arthralgias and back pain.   Skin: Negative for rash and wound.   Allergic/Immunologic: Negative for environmental allergies and food allergies.   Neurological: Negative for dizziness and headaches.   Hematological: Negative for adenopathy. Does not bruise/bleed easily.   Psychiatric/Behavioral: Negative for sleep disturbance. The patient is nervous/anxious.         Recurring episodes of anxiety and distress at home.  Functions well at work.       Objective   Blood  "pressure 130/80, pulse 74, height 188 cm (74.02\"), weight 91.3 kg (201 lb 4.8 oz), SpO2 99 %.    Physical Exam   Const: NAD, A&Ox4, Pleasant, Cooperative  Eyes: EOMI, no conjunctivitis  ENT: No nasal discharge present, neck supple  Cardiac: Regular rate and rhythm, no peripheral edema or cyanosis  Resp: Respiratory rate within normal limits, no increased work of breathing, no audible wheezing or retractions noted  GI: No distention or ascites  MSK: Motor and sensation grossly intact in bilateral upper extremities  Neurologic, CN II-XII grossly intact  Psych: Appropriate mood and behavior.  Skin: Pink, warm, dry  PHQ-9 Depression Screening  Little interest or pleasure in doing things? 0   Feeling down, depressed, or hopeless? 0   Trouble falling or staying asleep, or sleeping too much?     Feeling tired or having little energy?     Poor appetite or overeating?     Feeling bad about yourself - or that you are a failure or have let yourself or your family down?     Trouble concentrating on things, such as reading the newspaper or watching television?     Moving or speaking so slowly that other people could have noticed? Or the opposite - being so fidgety or restless that you have been moving around a lot more than usual?     Thoughts that you would be better off dead, or of hurting yourself in some way?     PHQ-9 Total Score 0   If you checked off any problems, how difficult have these problems made it for you to do your work, take care of things at home, or get along with other people?       Procedures  Assessment/Plan   Leon was seen today for follow-up.    Diagnoses and all orders for this visit:    Anxiety    Need for influenza vaccination  -     Cancel: Flucelvax Quad=>4Years ([id number])    Other orders  -     Fluarix/Fluzone/Afluria/FluLaval ([id number])      The patient has a persistent stress disorder for many years.  It is likely related to his intense job.  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  He should work on more disciplined methods for controlling temper and dealing effectively with his children.  Continued cognitive behavioral therapy is recommended, however structured visits with a clinical psychologist seem to add more stress than they relieve given his busy schedule.  · .  He was started on Wellbutrin 150 mg XL at his visit in August, emailed after a couple weeks saying that he was doing well and we increased his dose to the full therapeutic dosage  · .  He is doing very well with this medication, no complaints or concerns  · His PHQ9 shows objective improvement      Patient Instructions   1. If slow or no improvement over the next couple of weeks, let me know and we can adjust/switch.    2. Consider more individual therapy.

## 2018-11-26 ENCOUNTER — OFFICE VISIT (OUTPATIENT)
Dept: FAMILY MEDICINE CLINIC | Facility: CLINIC | Age: 38
End: 2018-11-26

## 2018-11-26 VITALS
HEART RATE: 55 BPM | WEIGHT: 204.4 LBS | BODY MASS INDEX: 26.23 KG/M2 | SYSTOLIC BLOOD PRESSURE: 134 MMHG | HEIGHT: 74 IN | DIASTOLIC BLOOD PRESSURE: 88 MMHG | OXYGEN SATURATION: 98 %

## 2018-11-26 DIAGNOSIS — F41.9 ANXIETY: Primary | ICD-10-CM

## 2018-11-26 PROCEDURE — 99214 OFFICE O/P EST MOD 30 MIN: CPT | Performed by: FAMILY MEDICINE

## 2018-12-24 RX ORDER — BUPROPION HYDROCHLORIDE 300 MG/1
TABLET ORAL
Qty: 30 TABLET | Refills: 1 | Status: SHIPPED | OUTPATIENT
Start: 2018-12-24 | End: 2019-01-28

## 2019-01-19 ENCOUNTER — APPOINTMENT (OUTPATIENT)
Dept: CT IMAGING | Facility: HOSPITAL | Age: 39
End: 2019-01-19

## 2019-01-19 ENCOUNTER — APPOINTMENT (OUTPATIENT)
Dept: GENERAL RADIOLOGY | Facility: HOSPITAL | Age: 39
End: 2019-01-19

## 2019-01-19 ENCOUNTER — APPOINTMENT (OUTPATIENT)
Dept: CARDIOLOGY | Facility: HOSPITAL | Age: 39
End: 2019-01-19
Attending: PHYSICIAN ASSISTANT

## 2019-01-19 ENCOUNTER — HOSPITAL ENCOUNTER (INPATIENT)
Facility: HOSPITAL | Age: 39
LOS: 2 days | Discharge: HOME | End: 2019-01-21
Attending: EMERGENCY MEDICINE | Admitting: INTERNAL MEDICINE

## 2019-01-19 DIAGNOSIS — R77.8 ELEVATED TROPONIN: ICD-10-CM

## 2019-01-19 DIAGNOSIS — I21.4 NSTEMI (NON-ST ELEVATED MYOCARDIAL INFARCTION) (HCC): Primary | ICD-10-CM

## 2019-01-19 PROBLEM — R79.89 ELEVATED TROPONIN: Status: ACTIVE | Noted: 2019-01-19

## 2019-01-19 LAB
ALBUMIN SERPL-MCNC: 5.21 G/DL (ref 3.2–4.8)
ALBUMIN/GLOB SERPL: 2.1 G/DL (ref 1.5–2.5)
ALP SERPL-CCNC: 27 U/L (ref 25–100)
ALT SERPL W P-5'-P-CCNC: 26 U/L (ref 7–40)
ANION GAP SERPL CALCULATED.3IONS-SCNC: 7 MMOL/L (ref 3–11)
APTT PPP: 36.2 SECONDS (ref 85–120)
AST SERPL-CCNC: 41 U/L (ref 0–33)
BASOPHILS # BLD AUTO: 0.04 10*3/MM3 (ref 0–0.2)
BASOPHILS NFR BLD AUTO: 0.5 % (ref 0–1)
BILIRUB SERPL-MCNC: 0.8 MG/DL (ref 0.3–1.2)
BNP SERPL-MCNC: 6 PG/ML (ref 0–100)
BUN BLD-MCNC: 13 MG/DL (ref 9–23)
BUN/CREAT SERPL: 11.1 (ref 7–25)
CALCIUM SPEC-SCNC: 9.7 MG/DL (ref 8.7–10.4)
CHLORIDE SERPL-SCNC: 105 MMOL/L (ref 99–109)
CO2 SERPL-SCNC: 28 MMOL/L (ref 20–31)
CREAT BLD-MCNC: 1.17 MG/DL (ref 0.6–1.3)
DEPRECATED RDW RBC AUTO: 37.1 FL (ref 37–54)
EOSINOPHIL # BLD AUTO: 0.05 10*3/MM3 (ref 0–0.3)
EOSINOPHIL NFR BLD AUTO: 0.6 % (ref 0–3)
ERYTHROCYTE [DISTWIDTH] IN BLOOD BY AUTOMATED COUNT: 12.1 % (ref 11.3–14.5)
GFR SERPL CREATININE-BSD FRML MDRD: 70 ML/MIN/1.73
GLOBULIN UR ELPH-MCNC: 2.5 GM/DL
GLUCOSE BLD-MCNC: 83 MG/DL (ref 70–100)
HCT VFR BLD AUTO: 44.9 % (ref 38.9–50.9)
HGB BLD-MCNC: 16.1 G/DL (ref 13.1–17.5)
HOLD SPECIMEN: NORMAL
HOLD SPECIMEN: NORMAL
IMM GRANULOCYTES # BLD AUTO: 0.02 10*3/MM3 (ref 0–0.03)
IMM GRANULOCYTES NFR BLD AUTO: 0.3 % (ref 0–0.6)
INR PPP: 1.09 (ref 0.85–1.16)
LIPASE SERPL-CCNC: 36 U/L (ref 6–51)
LYMPHOCYTES # BLD AUTO: 1.67 10*3/MM3 (ref 0.6–4.8)
LYMPHOCYTES NFR BLD AUTO: 21.2 % (ref 24–44)
MCH RBC QN AUTO: 30.3 PG (ref 27–31)
MCHC RBC AUTO-ENTMCNC: 35.9 G/DL (ref 32–36)
MCV RBC AUTO: 84.4 FL (ref 80–99)
MONOCYTES # BLD AUTO: 0.56 10*3/MM3 (ref 0–1)
MONOCYTES NFR BLD AUTO: 7.1 % (ref 0–12)
NEUTROPHILS # BLD AUTO: 5.55 10*3/MM3 (ref 1.5–8.3)
NEUTROPHILS NFR BLD AUTO: 70.3 % (ref 41–71)
PLATELET # BLD AUTO: 188 10*3/MM3 (ref 150–450)
PMV BLD AUTO: 9.2 FL (ref 6–12)
POTASSIUM BLD-SCNC: 3.6 MMOL/L (ref 3.5–5.5)
PROT SERPL-MCNC: 7.7 G/DL (ref 5.7–8.2)
PROTHROMBIN TIME: 13.5 SECONDS (ref 11.2–14.3)
RBC # BLD AUTO: 5.32 10*6/MM3 (ref 4.2–5.76)
SODIUM BLD-SCNC: 140 MMOL/L (ref 132–146)
TROPONIN I SERPL-MCNC: 2.24 NG/ML (ref 0–0.07)
TROPONIN I SERPL-MCNC: 2.42 NG/ML (ref 0–0.07)
TROPONIN I SERPL-MCNC: 3.15 NG/ML
UFH PPP CHRO-ACNC: 0.1 IU/ML (ref 0.3–0.7)
UFH PPP CHRO-ACNC: 0.24 IU/ML (ref 0.3–0.7)
WBC NRBC COR # BLD: 7.89 10*3/MM3 (ref 3.5–10.8)
WHOLE BLOOD HOLD SPECIMEN: NORMAL
WHOLE BLOOD HOLD SPECIMEN: NORMAL

## 2019-01-19 PROCEDURE — 85730 THROMBOPLASTIN TIME PARTIAL: CPT | Performed by: PHYSICIAN ASSISTANT

## 2019-01-19 PROCEDURE — 25010000002 HEPARIN (PORCINE) PER 1000 UNITS: Performed by: PHYSICIAN ASSISTANT

## 2019-01-19 PROCEDURE — 83880 ASSAY OF NATRIURETIC PEPTIDE: CPT | Performed by: EMERGENCY MEDICINE

## 2019-01-19 PROCEDURE — 80053 COMPREHEN METABOLIC PANEL: CPT | Performed by: EMERGENCY MEDICINE

## 2019-01-19 PROCEDURE — 84478 ASSAY OF TRIGLYCERIDES: CPT

## 2019-01-19 PROCEDURE — 25010000002 LORAZEPAM PER 2 MG: Performed by: EMERGENCY MEDICINE

## 2019-01-19 PROCEDURE — 84484 ASSAY OF TROPONIN QUANT: CPT

## 2019-01-19 PROCEDURE — 85520 HEPARIN ASSAY: CPT | Performed by: FAMILY MEDICINE

## 2019-01-19 PROCEDURE — 83690 ASSAY OF LIPASE: CPT | Performed by: EMERGENCY MEDICINE

## 2019-01-19 PROCEDURE — 71275 CT ANGIOGRAPHY CHEST: CPT

## 2019-01-19 PROCEDURE — 0 IOPAMIDOL PER 1 ML: Performed by: EMERGENCY MEDICINE

## 2019-01-19 PROCEDURE — 71045 X-RAY EXAM CHEST 1 VIEW: CPT

## 2019-01-19 PROCEDURE — 99284 EMERGENCY DEPT VISIT MOD MDM: CPT

## 2019-01-19 PROCEDURE — 99223 1ST HOSP IP/OBS HIGH 75: CPT | Performed by: INTERNAL MEDICINE

## 2019-01-19 PROCEDURE — 93005 ELECTROCARDIOGRAM TRACING: CPT | Performed by: EMERGENCY MEDICINE

## 2019-01-19 PROCEDURE — 84484 ASSAY OF TROPONIN QUANT: CPT | Performed by: NURSE PRACTITIONER

## 2019-01-19 PROCEDURE — 85610 PROTHROMBIN TIME: CPT | Performed by: PHYSICIAN ASSISTANT

## 2019-01-19 PROCEDURE — 85025 COMPLETE CBC W/AUTO DIFF WBC: CPT | Performed by: EMERGENCY MEDICINE

## 2019-01-19 PROCEDURE — 93306 TTE W/DOPPLER COMPLETE: CPT

## 2019-01-19 RX ORDER — LORAZEPAM 0.5 MG/1
0.5 TABLET ORAL EVERY 12 HOURS PRN
Status: DISCONTINUED | OUTPATIENT
Start: 2019-01-19 | End: 2019-01-21 | Stop reason: HOSPADM

## 2019-01-19 RX ORDER — HEPARIN SODIUM 10000 [USP'U]/100ML
13 INJECTION, SOLUTION INTRAVENOUS
Status: DISCONTINUED | OUTPATIENT
Start: 2019-01-19 | End: 2019-01-21

## 2019-01-19 RX ORDER — SODIUM CHLORIDE 9 MG/ML
125 INJECTION, SOLUTION INTRAVENOUS CONTINUOUS
Status: DISCONTINUED | OUTPATIENT
Start: 2019-01-19 | End: 2019-01-21

## 2019-01-19 RX ORDER — HEPARIN SODIUM 1000 [USP'U]/ML
4000 INJECTION, SOLUTION INTRAVENOUS; SUBCUTANEOUS ONCE
Status: COMPLETED | OUTPATIENT
Start: 2019-01-19 | End: 2019-01-19

## 2019-01-19 RX ORDER — ONDANSETRON 4 MG/1
4 TABLET, FILM COATED ORAL EVERY 6 HOURS PRN
Status: DISCONTINUED | OUTPATIENT
Start: 2019-01-19 | End: 2019-01-21 | Stop reason: HOSPADM

## 2019-01-19 RX ORDER — POTASSIUM CHLORIDE 7.45 MG/ML
10 INJECTION INTRAVENOUS
Status: DISCONTINUED | OUTPATIENT
Start: 2019-01-19 | End: 2019-01-21

## 2019-01-19 RX ORDER — SODIUM CHLORIDE 0.9 % (FLUSH) 0.9 %
3-10 SYRINGE (ML) INJECTION AS NEEDED
Status: DISCONTINUED | OUTPATIENT
Start: 2019-01-19 | End: 2019-01-21 | Stop reason: HOSPADM

## 2019-01-19 RX ORDER — ATORVASTATIN CALCIUM 40 MG/1
80 TABLET, FILM COATED ORAL NIGHTLY
Status: DISCONTINUED | OUTPATIENT
Start: 2019-01-19 | End: 2019-01-21 | Stop reason: HOSPADM

## 2019-01-19 RX ORDER — BUPROPION HYDROCHLORIDE 150 MG/1
300 TABLET ORAL DAILY
Status: DISCONTINUED | OUTPATIENT
Start: 2019-01-20 | End: 2019-01-21 | Stop reason: HOSPADM

## 2019-01-19 RX ORDER — CLOPIDOGREL BISULFATE 75 MG/1
300 TABLET ORAL ONCE
Status: COMPLETED | OUTPATIENT
Start: 2019-01-19 | End: 2019-01-19

## 2019-01-19 RX ORDER — NITROGLYCERIN 20 MG/100ML
5-200 INJECTION INTRAVENOUS
Status: DISCONTINUED | OUTPATIENT
Start: 2019-01-19 | End: 2019-01-21

## 2019-01-19 RX ORDER — ACETAMINOPHEN 325 MG/1
650 TABLET ORAL EVERY 4 HOURS PRN
Status: DISCONTINUED | OUTPATIENT
Start: 2019-01-19 | End: 2019-01-21 | Stop reason: HOSPADM

## 2019-01-19 RX ORDER — SODIUM CHLORIDE 9 MG/ML
100 INJECTION, SOLUTION INTRAVENOUS CONTINUOUS
Status: DISCONTINUED | OUTPATIENT
Start: 2019-01-19 | End: 2019-01-19

## 2019-01-19 RX ORDER — ASPIRIN 81 MG/1
324 TABLET, CHEWABLE ORAL ONCE
Status: DISCONTINUED | OUTPATIENT
Start: 2019-01-19 | End: 2019-01-19 | Stop reason: SDUPTHER

## 2019-01-19 RX ORDER — POTASSIUM CHLORIDE 1.5 G/1.77G
40 POWDER, FOR SOLUTION ORAL AS NEEDED
Status: DISCONTINUED | OUTPATIENT
Start: 2019-01-19 | End: 2019-01-21

## 2019-01-19 RX ORDER — ASPIRIN 325 MG
325 TABLET ORAL ONCE
Status: DISCONTINUED | OUTPATIENT
Start: 2019-01-19 | End: 2019-01-19 | Stop reason: SDUPTHER

## 2019-01-19 RX ORDER — LORAZEPAM 2 MG/ML
1 INJECTION INTRAMUSCULAR ONCE
Status: COMPLETED | OUTPATIENT
Start: 2019-01-19 | End: 2019-01-19

## 2019-01-19 RX ORDER — ONDANSETRON 2 MG/ML
4 INJECTION INTRAMUSCULAR; INTRAVENOUS EVERY 6 HOURS PRN
Status: DISCONTINUED | OUTPATIENT
Start: 2019-01-19 | End: 2019-01-21 | Stop reason: HOSPADM

## 2019-01-19 RX ORDER — DIAZEPAM 5 MG/ML
5 INJECTION, SOLUTION INTRAMUSCULAR; INTRAVENOUS ONCE
Status: DISCONTINUED | OUTPATIENT
Start: 2019-01-19 | End: 2019-01-19

## 2019-01-19 RX ORDER — POTASSIUM CHLORIDE 750 MG/1
40 CAPSULE, EXTENDED RELEASE ORAL AS NEEDED
Status: DISCONTINUED | OUTPATIENT
Start: 2019-01-19 | End: 2019-01-21

## 2019-01-19 RX ORDER — SODIUM CHLORIDE 0.9 % (FLUSH) 0.9 %
3 SYRINGE (ML) INJECTION EVERY 12 HOURS SCHEDULED
Status: DISCONTINUED | OUTPATIENT
Start: 2019-01-19 | End: 2019-01-21 | Stop reason: HOSPADM

## 2019-01-19 RX ORDER — SODIUM CHLORIDE 0.9 % (FLUSH) 0.9 %
10 SYRINGE (ML) INJECTION AS NEEDED
Status: DISCONTINUED | OUTPATIENT
Start: 2019-01-19 | End: 2019-01-21 | Stop reason: HOSPADM

## 2019-01-19 RX ADMIN — LORAZEPAM 1 MG: 2 INJECTION INTRAMUSCULAR; INTRAVENOUS at 17:50

## 2019-01-19 RX ADMIN — CLOPIDOGREL BISULFATE 300 MG: 75 TABLET ORAL at 17:18

## 2019-01-19 RX ADMIN — SODIUM CHLORIDE 500 ML: 9 INJECTION, SOLUTION INTRAVENOUS at 17:19

## 2019-01-19 RX ADMIN — HEPARIN SODIUM 4000 UNITS: 1000 INJECTION, SOLUTION INTRAVENOUS; SUBCUTANEOUS at 17:19

## 2019-01-19 RX ADMIN — NITROGLYCERIN 5 MCG/MIN: 20 INJECTION INTRAVENOUS at 17:19

## 2019-01-19 RX ADMIN — ASPIRIN 325 MG ORAL TABLET 325 MG: 325 PILL ORAL at 17:18

## 2019-01-19 RX ADMIN — METOPROLOL TARTRATE 25 MG: 25 TABLET ORAL at 21:49

## 2019-01-19 RX ADMIN — IOPAMIDOL 80 ML: 755 INJECTION, SOLUTION INTRAVENOUS at 19:24

## 2019-01-19 RX ADMIN — SODIUM CHLORIDE 125 ML/HR: 9 INJECTION, SOLUTION INTRAVENOUS at 21:48

## 2019-01-19 RX ADMIN — POTASSIUM CHLORIDE 40 MEQ: 750 CAPSULE, EXTENDED RELEASE ORAL at 21:59

## 2019-01-19 RX ADMIN — HEPARIN SODIUM 11 UNITS/KG/HR: 10000 INJECTION, SOLUTION INTRAVENOUS at 17:20

## 2019-01-19 RX ADMIN — LORAZEPAM 0.5 MG: 0.5 TABLET ORAL at 21:59

## 2019-01-19 RX ADMIN — ATORVASTATIN CALCIUM 80 MG: 40 TABLET, FILM COATED ORAL at 21:49

## 2019-01-19 NOTE — ED PROVIDER NOTES
Subjective   Patient presents complaining of dull central last chest pain ongoing for the past 2 days.  He reports a history of anxiety.  He denies any exacerbating or alleviating factors.  He denies any shortness of breath or cough.  He denies any nausea or vomiting.  He was seen here by urgent care after an abnormal EKG.  The patient's father has a history of cardiomyopathy.  The patient had been evaluated by cardiologist and told he may have early cardiomyopathy however he is followed now by Dr. Miner who does not feel he has any abnormalities.        Chest Pain   Pain location:  Substernal area  Pain quality: dull    Pain radiates to:  Does not radiate  Pain severity:  Moderate  Onset quality:  Gradual  Timing:  Constant  Progression:  Unchanged  Chronicity:  New  Context: not breathing and not lifting    Relieved by:  Nothing  Worsened by:  Nothing  Ineffective treatments:  None tried  Associated symptoms: no abdominal pain, no cough, no fever, no nausea, no shortness of breath and no vomiting        Review of Systems   Constitutional: Negative for chills and fever.   Respiratory: Negative for cough and shortness of breath.    Cardiovascular: Positive for chest pain.   Gastrointestinal: Negative for abdominal pain, nausea and vomiting.   Psychiatric/Behavioral: The patient is nervous/anxious.    All other systems reviewed and are negative.      Past Medical History:   Diagnosis Date   • Allergic rhinitis Lifelong   • Anxiety 2015    Recurrent anxiety and distress at home   • Anxiety    • AV yudi re-entry tachycardia (CMS/HCC) 2011    RFA - successful   • Closed left ankle fracture 1988   • Hyperlipidemia 2016    Cholesterol 134   • Prehypertension 2016    Average blood pressure 125/85       No Known Allergies    Past Surgical History:   Procedure Laterality Date   • RADIOFREQUENCY ABLATION  2011    AV nod tach       Family History   Problem Relation Age of Onset   • No Known Problems Mother    •  Hypertrophic cardiomyopathy Father          age 72   • Hypertension Father        Social History     Socioeconomic History   • Marital status:      Spouse name: Not on file   • Number of children: Not on file   • Years of education: Not on file   • Highest education level: Not on file   Tobacco Use   • Smoking status: Never Smoker   • Smokeless tobacco: Never Used   Substance and Sexual Activity   • Alcohol use: Yes     Alcohol/week: 1.2 oz     Types: 2 Glasses of wine per week   • Drug use: No   • Sexual activity: Yes     Partners: Female   Social History Narrative    Domestic life : Lives in private home with wife and 2 children        [employer] : [employer]        Sleep hygiene : In bed 10 PM to 6 AM for 8 hours of sleep        Caffeine use : 1 or 2 cups of coffee daily        Exercise habits : Jogs 3 miles 4 days weekly.  200 pushups 3 days weekly.          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 He appears well-developed and well-nourished.   HENT:   Head: Normocephalic and atraumatic.   Cardiovascular: Normal rate and regular rhythm. Exam reveals no gallop and no friction rub.   No murmur heard.  Pulmonary/Chest: Effort normal and breath sounds normal. No stridor. No tachypnea. No respiratory distress. He has no wheezes. He has no rales.   Abdominal: Soft. He exhibits no distension. There is no tenderness. There is no guarding.   Musculoskeletal: Normal range of motion.        Right lower leg: Normal.        Left lower leg: Normal.   Neurological: He is alert and oriented to person, place, and time.   Skin: Skin is warm and dry.   Psychiatric: He has a normal mood and affect. His behavior is  normal.       Procedures           ED Course  ED Course as of Jan 19 1903   Sat Jan 19, 2019   1659 Patient is seen and evaluated by me.  He continues have discomfort.  He has anterior chest pain without radiation.  ECG shows J-point elevation in a single lead without contiguous lead elevation.  He does not meet criteria for an ST elevation MI.I discussed with Dr. Scherer who is requested hospitalist admission and will consult for cardiology.  He is in agreement with cardiac medications as ordered.I discussed the plan with the patient and spouse who agree with plan of care.  [HH]   1835 aPTT: (!) 36.2 [WT]   1835 Protime: 13.5 [WT]   1835 Troponin I: (!!) 2.42 [WT]   1835 Hemoglobin: 16.1 [WT]   1835 Hematocrit: 44.9 [WT]   1835 BNP: 6.0 [WT]   1835 Lipase: 36 [WT]   1836 CXR: No acute cardiopulmonary process.     [WT]   1837 EKG 1602, rate 69 NSR, mild elevation of ST segment v3.   [WT]   1838 EKG 1833 shows NSR 77  [WT]   1902 EKG 1833 NSR 77  [WT]      ED Course User Index  [HH] Evan Powell MD  [WT] Briseyda Carranza, STACY                  MDM  Number of Diagnoses or Management Options  Elevated troponin:   NSTEMI (non-ST elevated myocardial infarction) (CMS/HCC):   Diagnosis management comments: Patient presents complaining of dull chest pain.  Differential diagnosis includes ACS, pulmonary embolism, musculoskeletal chest pain, anxiety.  Plan to obtain CBC, CMP, troponin, chest x-ray, EKG.  PERC negative, no PE workup indicated. Patient had mild ST elevation in V3 on EKG.  His troponin was elevated at 2.4.  He was given heparin bolus followed by heparin infusion, nitroglycerin drip, aspirin, Plavix as well as Ativan for anxiety.  Dr. Powell did speak with cardiology and the patient will be admitted to the hospitalist.         Final diagnoses:   Elevated troponin   NSTEMI (non-ST elevated myocardial infarction) (CMS/HCC)            Briseyda Carranza PA-C  01/19/19 1903

## 2019-01-20 PROBLEM — R77.8 ELEVATED TROPONIN: Status: RESOLVED | Noted: 2019-01-19 | Resolved: 2019-01-20

## 2019-01-20 PROBLEM — R79.89 ELEVATED TROPONIN: Status: RESOLVED | Noted: 2019-01-19 | Resolved: 2019-01-20

## 2019-01-20 LAB
ANION GAP SERPL CALCULATED.3IONS-SCNC: 3 MMOL/L (ref 3–11)
ARTICHOKE IGE QN: 133 MG/DL (ref 0–130)
BASOPHILS # BLD AUTO: 0.03 10*3/MM3 (ref 0–0.2)
BASOPHILS NFR BLD AUTO: 0.4 % (ref 0–1)
BH CV ECHO MEAS - AO MAX PG (FULL): 4.2 MMHG
BH CV ECHO MEAS - AO MAX PG: 11.9 MMHG
BH CV ECHO MEAS - AO ROOT AREA (BSA CORRECTED): 1.7
BH CV ECHO MEAS - AO ROOT AREA: 11.3 CM^2
BH CV ECHO MEAS - AO ROOT DIAM: 3.8 CM
BH CV ECHO MEAS - AO V2 MAX: 172.2 CM/SEC
BH CV ECHO MEAS - AVA(V,A): 2.6 CM^2
BH CV ECHO MEAS - AVA(V,D): 2.6 CM^2
BH CV ECHO MEAS - BSA(HAYCOCK): 2.2 M^2
BH CV ECHO MEAS - BSA: 2.2 M^2
BH CV ECHO MEAS - BZI_BMI: 25.7 KILOGRAMS/M^2
BH CV ECHO MEAS - BZI_METRIC_HEIGHT: 188 CM
BH CV ECHO MEAS - BZI_METRIC_WEIGHT: 90.7 KG
BH CV ECHO MEAS - EDV(CUBED): 126.3 ML
BH CV ECHO MEAS - EDV(MOD-SP2): 94 ML
BH CV ECHO MEAS - EDV(MOD-SP4): 98 ML
BH CV ECHO MEAS - EDV(TEICH): 119.2 ML
BH CV ECHO MEAS - EF(CUBED): 75.2 %
BH CV ECHO MEAS - EF(MOD-BP): 77 %
BH CV ECHO MEAS - EF(MOD-SP2): 81.9 %
BH CV ECHO MEAS - EF(MOD-SP4): 71.4 %
BH CV ECHO MEAS - EF(TEICH): 66.9 %
BH CV ECHO MEAS - ESV(CUBED): 31.3 ML
BH CV ECHO MEAS - ESV(MOD-SP2): 17 ML
BH CV ECHO MEAS - ESV(MOD-SP4): 28 ML
BH CV ECHO MEAS - ESV(TEICH): 39.5 ML
BH CV ECHO MEAS - FS: 37.2 %
BH CV ECHO MEAS - IVS/LVPW: 1
BH CV ECHO MEAS - IVSD: 0.88 CM
BH CV ECHO MEAS - LA DIMENSION: 2.1 CM
BH CV ECHO MEAS - LA/AO: 0.55
BH CV ECHO MEAS - LAD MAJOR: 4.4 CM
BH CV ECHO MEAS - LAT PEAK E' VEL: 14.1 CM/SEC
BH CV ECHO MEAS - LATERAL E/E' RATIO: 5.4
BH CV ECHO MEAS - LV DIASTOLIC VOL/BSA (35-75): 45.1 ML/M^2
BH CV ECHO MEAS - LV MASS(C)D: 153.1 GRAMS
BH CV ECHO MEAS - LV MASS(C)DI: 70.5 GRAMS/M^2
BH CV ECHO MEAS - LV MAX PG: 7.6 MMHG
BH CV ECHO MEAS - LV SYSTOLIC VOL/BSA (12-30): 12.9 ML/M^2
BH CV ECHO MEAS - LV V1 MAX: 138.2 CM/SEC
BH CV ECHO MEAS - LVIDD: 5 CM
BH CV ECHO MEAS - LVIDS: 3.2 CM
BH CV ECHO MEAS - LVLD AP2: 8.7 CM
BH CV ECHO MEAS - LVLD AP4: 9.1 CM
BH CV ECHO MEAS - LVLS AP2: 6.3 CM
BH CV ECHO MEAS - LVLS AP4: 7 CM
BH CV ECHO MEAS - LVOT AREA (M): 3.1 CM^2
BH CV ECHO MEAS - LVOT AREA: 3.2 CM^2
BH CV ECHO MEAS - LVOT DIAM: 2 CM
BH CV ECHO MEAS - LVPWD: 0.87 CM
BH CV ECHO MEAS - MED PEAK E' VEL: 8.7 CM/SEC
BH CV ECHO MEAS - MEDIAL E/E' RATIO: 8.6
BH CV ECHO MEAS - MV A MAX VEL: 66.5 CM/SEC
BH CV ECHO MEAS - MV DEC TIME: 0.27 SEC
BH CV ECHO MEAS - MV E MAX VEL: 75.8 CM/SEC
BH CV ECHO MEAS - MV E/A: 1.1
BH CV ECHO MEAS - PA ACC SLOPE: 514.4 CM/SEC^2
BH CV ECHO MEAS - PA ACC TIME: 0.13 SEC
BH CV ECHO MEAS - PA PR(ACCEL): 18.4 MMHG
BH CV ECHO MEAS - PULM DIAS VEL: 39.4 CM/SEC
BH CV ECHO MEAS - PULM S/D: 1.4
BH CV ECHO MEAS - PULM SYS VEL: 54.8 CM/SEC
BH CV ECHO MEAS - RAP SYSTOLE: 3 MMHG
BH CV ECHO MEAS - RVDD: 2.9 CM
BH CV ECHO MEAS - RVSP: 29 MMHG
BH CV ECHO MEAS - SI(CUBED): 43.7 ML/M^2
BH CV ECHO MEAS - SI(MOD-SP2): 35.4 ML/M^2
BH CV ECHO MEAS - SI(MOD-SP4): 32.2 ML/M^2
BH CV ECHO MEAS - SI(TEICH): 36.7 ML/M^2
BH CV ECHO MEAS - SV(CUBED): 95 ML
BH CV ECHO MEAS - SV(MOD-SP2): 77 ML
BH CV ECHO MEAS - SV(MOD-SP4): 70 ML
BH CV ECHO MEAS - SV(TEICH): 79.8 ML
BH CV ECHO MEAS - TAPSE (>1.6): 2.3 CM2
BH CV ECHO MEAS - TR MAX PG: 26 MMHG
BH CV ECHO MEAS - TR MAX VEL: 253 CM/SEC
BH CV ECHO MEASUREMENTS AVERAGE E/E' RATIO: 6.65
BH CV VAS BP LEFT ARM: NORMAL MMHG
BH CV XLRA - RV BASE: 3 CM
BH CV XLRA - RV LENGTH: 8.6 CM
BH CV XLRA - RV MID: 2.9 CM
BH CV XLRA - TDI S': 12.5 CM/SEC
BUN BLD-MCNC: 12 MG/DL (ref 9–23)
BUN/CREAT SERPL: 11.9 (ref 7–25)
CALCIUM SPEC-SCNC: 8.8 MG/DL (ref 8.7–10.4)
CHLORIDE SERPL-SCNC: 109 MMOL/L (ref 99–109)
CHOLEST SERPL-MCNC: 185 MG/DL (ref 0–200)
CK MB SERPL-CCNC: 4.46 NG/ML (ref 0–5)
CK MB SERPL-RTO: 2.1 % (ref 0–3)
CK SERPL-CCNC: 212 U/L (ref 26–174)
CO2 SERPL-SCNC: 27 MMOL/L (ref 20–31)
CREAT BLD-MCNC: 1.01 MG/DL (ref 0.6–1.3)
DEPRECATED RDW RBC AUTO: 39.6 FL (ref 37–54)
EOSINOPHIL # BLD AUTO: 0.12 10*3/MM3 (ref 0–0.3)
EOSINOPHIL NFR BLD AUTO: 1.4 % (ref 0–3)
ERYTHROCYTE [DISTWIDTH] IN BLOOD BY AUTOMATED COUNT: 12.5 % (ref 11.3–14.5)
ERYTHROCYTE [SEDIMENTATION RATE] IN BLOOD: 5 MM/HR (ref 0–15)
GFR SERPL CREATININE-BSD FRML MDRD: 83 ML/MIN/1.73
GLUCOSE BLD-MCNC: 97 MG/DL (ref 70–100)
HBA1C MFR BLD: 5.1 % (ref 4.8–5.6)
HCT VFR BLD AUTO: 40.3 % (ref 38.9–50.9)
HDLC SERPL-MCNC: 53 MG/DL (ref 40–60)
HGB BLD-MCNC: 13.8 G/DL (ref 13.1–17.5)
IMM GRANULOCYTES # BLD AUTO: 0.02 10*3/MM3 (ref 0–0.03)
IMM GRANULOCYTES NFR BLD AUTO: 0.2 % (ref 0–0.6)
INR PPP: 1.12 (ref 0.85–1.16)
LEFT ATRIUM VOLUME INDEX: 17 ML/M^2
LEFT ATRIUM VOLUME: 37 ML
LYMPHOCYTES # BLD AUTO: 1.95 10*3/MM3 (ref 0.6–4.8)
LYMPHOCYTES NFR BLD AUTO: 23.4 % (ref 24–44)
MCH RBC QN AUTO: 29.7 PG (ref 27–31)
MCHC RBC AUTO-ENTMCNC: 34.2 G/DL (ref 32–36)
MCV RBC AUTO: 86.9 FL (ref 80–99)
MONOCYTES # BLD AUTO: 0.84 10*3/MM3 (ref 0–1)
MONOCYTES NFR BLD AUTO: 10.1 % (ref 0–12)
NEUTROPHILS # BLD AUTO: 5.38 10*3/MM3 (ref 1.5–8.3)
NEUTROPHILS NFR BLD AUTO: 64.7 % (ref 41–71)
PLATELET # BLD AUTO: 161 10*3/MM3 (ref 150–450)
PMV BLD AUTO: 9.8 FL (ref 6–12)
POTASSIUM BLD-SCNC: 4.8 MMOL/L (ref 3.5–5.5)
PROTHROMBIN TIME: 13.9 SECONDS (ref 11.2–14.3)
RBC # BLD AUTO: 4.64 10*6/MM3 (ref 4.2–5.76)
SODIUM BLD-SCNC: 139 MMOL/L (ref 132–146)
TRIGL SERPL-MCNC: 118 MG/DL (ref 0–150)
TRIGL SERPL-MCNC: 63 MG/DL (ref 0–150)
TROPONIN I SERPL-MCNC: 2.26 NG/ML
TSH SERPL DL<=0.05 MIU/L-ACNC: 1.33 MIU/ML (ref 0.35–5.35)
UFH PPP CHRO-ACNC: 0.32 IU/ML (ref 0.3–0.7)
UFH PPP CHRO-ACNC: 0.37 IU/ML (ref 0.3–0.7)
UFH PPP CHRO-ACNC: 0.44 IU/ML (ref 0.3–0.7)
WBC NRBC COR # BLD: 8.32 10*3/MM3 (ref 3.5–10.8)

## 2019-01-20 PROCEDURE — 85025 COMPLETE CBC W/AUTO DIFF WBC: CPT | Performed by: NURSE PRACTITIONER

## 2019-01-20 PROCEDURE — 85610 PROTHROMBIN TIME: CPT | Performed by: NURSE PRACTITIONER

## 2019-01-20 PROCEDURE — 80061 LIPID PANEL: CPT | Performed by: NURSE PRACTITIONER

## 2019-01-20 PROCEDURE — 82550 ASSAY OF CK (CPK): CPT | Performed by: HOSPITALIST

## 2019-01-20 PROCEDURE — 85652 RBC SED RATE AUTOMATED: CPT | Performed by: INTERNAL MEDICINE

## 2019-01-20 PROCEDURE — 82553 CREATINE MB FRACTION: CPT | Performed by: HOSPITALIST

## 2019-01-20 PROCEDURE — 84484 ASSAY OF TROPONIN QUANT: CPT | Performed by: NURSE PRACTITIONER

## 2019-01-20 PROCEDURE — 83036 HEMOGLOBIN GLYCOSYLATED A1C: CPT | Performed by: NURSE PRACTITIONER

## 2019-01-20 PROCEDURE — 93010 ELECTROCARDIOGRAM REPORT: CPT | Performed by: INTERNAL MEDICINE

## 2019-01-20 PROCEDURE — 93005 ELECTROCARDIOGRAM TRACING: CPT | Performed by: NURSE PRACTITIONER

## 2019-01-20 PROCEDURE — 85520 HEPARIN ASSAY: CPT

## 2019-01-20 PROCEDURE — 80048 BASIC METABOLIC PNL TOTAL CA: CPT | Performed by: NURSE PRACTITIONER

## 2019-01-20 PROCEDURE — 99232 SBSQ HOSP IP/OBS MODERATE 35: CPT | Performed by: HOSPITALIST

## 2019-01-20 PROCEDURE — 25010000002 HEPARIN (PORCINE) PER 1000 UNITS

## 2019-01-20 PROCEDURE — 99254 IP/OBS CNSLTJ NEW/EST MOD 60: CPT | Performed by: INTERNAL MEDICINE

## 2019-01-20 PROCEDURE — 84443 ASSAY THYROID STIM HORMONE: CPT | Performed by: NURSE PRACTITIONER

## 2019-01-20 RX ADMIN — SODIUM CHLORIDE 125 ML/HR: 9 INJECTION, SOLUTION INTRAVENOUS at 20:35

## 2019-01-20 RX ADMIN — SODIUM CHLORIDE 125 ML/HR: 9 INJECTION, SOLUTION INTRAVENOUS at 05:42

## 2019-01-20 RX ADMIN — ATORVASTATIN CALCIUM 80 MG: 40 TABLET, FILM COATED ORAL at 20:34

## 2019-01-20 RX ADMIN — BUPROPION HYDROCHLORIDE 300 MG: 150 TABLET, FILM COATED, EXTENDED RELEASE ORAL at 08:58

## 2019-01-20 RX ADMIN — HEPARIN SODIUM 13 UNITS/KG/HR: 10000 INJECTION, SOLUTION INTRAVENOUS at 16:02

## 2019-01-20 RX ADMIN — LORAZEPAM 0.5 MG: 0.5 TABLET ORAL at 20:40

## 2019-01-20 NOTE — PROGRESS NOTES
[facility] Medicine Services  PROGRESS NOTE    Patient Name: Leon Carnes  : 1980  MRN: [id number]    Date of Admission: 2019  Length of Stay: 1  Primary Care Physician: South Hollingsworth DO    Subjective   Subjective     CC:  Elevated troponin    HPI:  Wife, Ana Luisa, in room today.  She is an advanced Practice Nurse Practitioner.  He was working at home and had chest pains.  He reportedly works out without chest pains.  Never had cardiac cath or heart attack.  Does not smoke.  Seen by Dr. Cooper today.    Review of Systems    Gen- No fevers, chills  CV- No chest pain, palpitations  Resp- No cough, dyspnea  GI- No N/V/D, abd pain    Otherwise ROS is negative except as mentioned in the HPI.    Objective   Objective     Vital Signs:   Temp:  [97.4 °F (36.3 °C)-97.9 °F (36.6 °C)] 97.9 °F (36.6 °C)  Heart Rate:  [47-93] 81  Resp:  [16-18] 16  BP: (110-184)/() 152/102     Physical Exam:    Constitutional: No acute distress, awake, alert  HENT: NCAT, mucous membranes moist  Respiratory: Clear to auscultation bilaterally, respiratory effort normal   Cardiovascular: RRR, no murmurs, rubs, or gallops, palpable pedal pulses bilaterally  Gastrointestinal: Positive bowel sounds, soft, nontender, nondistended  Musculoskeletal: No bilateral ankle edema  Psychiatric: Appropriate affect, cooperative  Neurologic: Oriented x 3, strength symmetric in all extremities, Cranial Nerves grossly intact to confrontation, speech clear  Skin: No rashes    Results Reviewed:  I have personally reviewed current lab, radiology, and data and agree.    Results from last 7 days   Lab Units 19  0446 19  1620   WBC 10*3/mm3 8.32 7.89   HEMOGLOBIN g/dL 13.8 16.1   HEMATOCRIT % 40.3 44.9   PLATELETS 10*3/mm3 161 188   INR  1.12 1.09     Results from last 7 days   Lab Units 19  0446 19  2231 19  1837  19  1620   SODIUM mmol/L 139  --   --   --  140   POTASSIUM mmol/L 4.8   --   --   --  3.6   CHLORIDE mmol/L 109  --   --   --  105   CO2 mmol/L 27.0  --   --   --  28.0   BUN mg/dL 12  --   --   --  13   CREATININE mg/dL 1.01  --   --   --  1.17   GLUCOSE mg/dL 97  --   --   --  83   CALCIUM mg/dL 8.8  --   --   --  9.7   ALT (SGPT) U/L  --   --   --   --  26   AST (SGOT) U/L  --   --   --   --  41*   TROPONIN I ng/mL 2.262* 3.145* 2.24*   < >  --     < > = values in this interval not displayed.     Estimated Creatinine Clearance: 126.6 mL/min (by C-G formula based on SCr of 1.01 mg/dL).    BNP   Date Value Ref Range Status   01/19/2019 6.0 0.0 - 100.0 pg/mL Final     Comment:     Results may be falsely decreased if patient taking Biotin.       Microbiology Results Abnormal     None          Imaging Results (last 24 hours)     Procedure Component Value Units Date/Time    CT Angiogram Chest With Contrast [[id number]] Collected:  01/19/19 1920     Updated:  01/19/19 1957    Narrative:       EXAM:    CT Angiography Chest With Contrast     EXAM DATE/TIME:    1/19/2019 7:20 PM     CLINICAL HISTORY:    39 years old, male; Non-st elevation (nstemi) myocardial infarction; Abnormal   levels of other serum enzymes; Pain; Chest pain; Prior surgery; Surgery type:   Ablation; Additional info: Chest pain, acute, pe suspected, low pretest prob     TECHNIQUE:    Axial computed tomographic angiography images of the chest with intravenous   contrast using CT angiography protocol. Coronal reformatted images were   subsequently obtained and reviewed.    All CT scans at this facility use at least one of these dose optimization   techniques: automated exposure control; mA and/or kV adjustment per patient   size (includes targeted exams where dose is matched to clinical indication); or   iterative reconstruction.    MIP reconstructed images were created and reviewed.     CONTRAST:    80 ml of iso 370 administered intravenously.     COMPARISON:    CR XR CHEST 1 VW 1/19/2019 4:32 PM     FINDINGS:    Pulmonary  arteries:  No acute pulmonary embolus.    Aorta: Normal. No aortic aneurysm. No aortic dissection.    Lungs: Normal. No consolidation. No masses.    Pleural space: Normal. No pneumothorax. No pleural effusion.    Heart:  No cardiomegaly.    Lymph nodes:  Right hilar calcified lymph nodes.    Bones/joints: Unremarkable. No acute fracture.    Soft tissues: Unremarkable.       Impression:       No acute pulmonary embolus.    THIS DOCUMENT HAS BEEN ELECTRONICALLY SIGNED BY EMILIANO XIE MD    XR Chest 1 View [[id number]] Collected:  01/19/19 1834     Updated:  01/19/19 1834    Narrative:          EXAMINATION: XR CHEST 1 VW - 1/19/2019     INDICATION: Chest pain.     COMPARISON: NONE     FINDINGS: Cardiac size within normal limits. Prior granulomatous  involvement including calcified right hilar lymph nodes without focal  consolidation or opacification. No pneumothorax or significant effusion.  Degenerative changes of the spine.           Impression:       No acute cardiopulmonary process.     DICTATED:   1/19/2019  EDITED/ls :   1/19/2019                 Results for orders placed during the hospital encounter of 01/19/19   Adult Transthoracic Echo Complete W/ Cont if Necessary Per Protocol    Narrative · Left ventricular systolic function is hyperdynamic (EF > 70).  · Adjacent to the left ventricle.  · Mild dilation of the sinuses of Valsalva is present.          I have reviewed the medications:    Current Facility-Administered Medications:   •  acetaminophen (TYLENOL) tablet 650 mg, 650 mg, Oral, Q4H PRN, Orquidea Cummings APRN  •  atorvastatin (LIPITOR) tablet 80 mg, 80 mg, Oral, Nightly, Orquidea Cummings APRN, 80 mg at 01/19/19 2149  •  buPROPion XL (WELLBUTRIN XL) 24 hr tablet 300 mg, 300 mg, Oral, Daily, Orquidea Cummings APRN, 300 mg at 01/20/19 0858  •  heparin 53377 units/250 mL (100 units/mL) in 0.45 % NaCl infusion, 13 Units/kg/hr, Intravenous, Titrated, Adonis Murphy, [facility], Last Rate: 11.79 mL/hr at 01/20/19  0039, 13 Units/kg/hr at 01/20/19 0039  •  LORazepam (ATIVAN) tablet 0.5 mg, 0.5 mg, Oral, Q12H PRN, Farzad Marmolejo MD, 0.5 mg at 01/19/19 2159  •  metoprolol tartrate (LOPRESSOR) tablet 25 mg, 25 mg, Oral, Q12H, Orquidea Cummings APRN, 25 mg at 01/19/19 2149  •  nitroglycerin 50 mg/250 mL (0.2 mg/mL) infusion, 5-200 mcg/min, Intravenous, Titrated, Briseyda Carranza PA-C, Last Rate: 3 mL/hr at 01/19/19 1757, 10 mcg/min at 01/19/19 1757  •  ondansetron (ZOFRAN) tablet 4 mg, 4 mg, Oral, Q6H PRN **OR** ondansetron (ZOFRAN) injection 4 mg, 4 mg, Intravenous, Q6H PRN, Orquidea Cummings APRN  •  Pharmacy Consult - [facility], , Does not apply, Daily, Re Avila, [facility]  •  Pharmacy to Dose Heparin, , Does not apply, Continuous PRN, Michael Armstrong, [facility]  •  potassium chloride (MICRO-K) CR capsule 40 mEq, 40 mEq, Oral, PRN, 40 mEq at 01/19/19 2159 **OR** potassium chloride (KLOR-CON) packet 40 mEq, 40 mEq, Oral, PRN **OR** potassium chloride 10 mEq in 100 mL IVPB, 10 mEq, Intravenous, Q1H PRN, Farzad Marmolejo MD  •  sodium chloride 0.9 % flush 10 mL, 10 mL, Intravenous, PRN, Evan Powell MD  •  sodium chloride 0.9 % flush 3 mL, 3 mL, Intravenous, Q12H, Orquidea Cummings APRN  •  sodium chloride 0.9 % flush 3-10 mL, 3-10 mL, Intravenous, PRN, Orquidea Cummings APRN  •  sodium chloride 0.9 % infusion, 125 mL/hr, Intravenous, Continuous, Evan Powell MD, Last Rate: 125 mL/hr at 01/20/19 0542, 125 mL/hr at 01/20/19 0542      Assessment/Plan   Assessment / Plan     Active Hospital Problems    Diagnosis Date Noted   • **NSTEMI (non-ST elevated myocardial infarction) (CMS/HCC) [I21.4] 01/19/2019   • Elevated troponin [R74.8] 01/19/2019   • HLD (hyperlipidemia) [E78.5] 03/08/2018   • Prehypertension [R03.0] 03/22/2017   • Anxiety [F41.9] 12/06/2016   • History of SVT  [I47.1] 12/06/2016     · 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 male here with NSTEMI    NSTEMI  - unclear etiology  - seen by Cardiology today  - continue drips  History of SVT  - s/p ablation by Ted  Family History of Heart Disease NOS  Stress  - self reported a lot of life stress    DVT Prophylaxis:  Heparin infusion    Disposition: I expect the patient to be discharged TBD    CODE STATUS:   Code Status and Medical Interventions:   Ordered at: 01/19/19 2027     Code Status:    CPR     Medical Interventions (Level of Support Prior to Arrest):    Full         Electronically signed by Arvind Jaime MD, 01/20/19, 3:23 PM.

## 2019-01-20 NOTE — H&P
"    [facility] Medicine Services  HISTORY AND PHYSICAL    Patient Name: Leon Carnes  : 1980  MRN: [id number]  Primary Care Physician: South Hollingsworth DO  Date of admission: 2019      Subjective   Subjective     Chief Complaint:  Chest pain     HPI:  Leon Carnes is a 39 y.o. male with no significant past medical history who presented to the ED w/ c/o chest pain/pressure. Pt reports that Friday morning he woke up with central chest discomfort and pressure. He reports that the pressure was intermittent throughout the day. Pain/discomfort located central chest/sternal region. On Saturday at 4am the pain woke him up. He took an advil which did help the pain a little but then returned. He went to the [facility] where an EKG was done. He was told that the EKG was \"abnormal\" and was instructed to come to the ED for further evaluation. Patient denies associated symptoms such as shortness of breath, nausea/vomiting, diaphoresis or radiation of pain. At its worst he reports the pain at a 3-4/10. He had costochondritis once several years ago and reports that initially the pain felt similar to that episode but was not as intense. He reports that he has been working on a project at his home and thought he \"over did it\" but when the pain persisted he felt that he needed further evaluation. He has no history of HTN or cardiac issues other than SVT s/p ablation in . Pt denies recent illness, fever/chills, N/V/D, abdominal pain, palpitations, dyspnea, cough, dysuria, edema, syncope, headache. Pt evaluated in the ED, first troponin was elevated at 2.42. Pt admitted to the hospital medicine service for further evaluation.       **Pt's father w/ a history of valvular dz s/p replacement, afib, cardiomyopathy w/ pacer, now . Maternal grandfather w/ hx of CAD, AMI. No other familial history of heart disease.     Review of Systems   Constitutional: Negative.  Negative for chills, " diaphoresis and fatigue.   HENT: Negative.    Eyes: Negative.    Respiratory: Negative.  Negative for cough and shortness of breath.    Cardiovascular: Positive for chest pain. Negative for palpitations and leg swelling.        Pressure- located central chest x 2 days. No associated symptoms.    Gastrointestinal: Negative.    Endocrine: Negative.    Genitourinary: Negative.    Musculoskeletal: Negative.    Skin: Negative.    Allergic/Immunologic: Negative.    Neurological: Negative.    Hematological: Negative.    Psychiatric/Behavioral:        Anxiety.   All other systems reviewed and are negative.       Otherwise complete ROS reviewed and is negative except as mentioned in the HPI.    Personal History     Past Medical History:   Diagnosis Date   • Allergic rhinitis Lifelong   • Anxiety 2015    Recurrent anxiety and distress at home   • Anxiety    • AV yudi re-entry tachycardia (CMS/HCC) 2011    RFA - successful   • Closed left ankle fracture 1988   • Costochondritis    • Hyperlipidemia 2016    Cholesterol 134   • Prehypertension 2016    Average blood pressure 125/85       Past Surgical History:   Procedure Laterality Date   • RADIOFREQUENCY ABLATION  2011    AV nod tach       Family History: family history includes Heart disease in his maternal grandfather; Hypertension in his father; Hypertrophic cardiomyopathy in his father; No Known Problems in his mother. Otherwise pertinent FHx was reviewed and unremarkable.     Social History:  reports that  has never smoked. he has never used smokeless tobacco. He reports that he drinks about 1.2 oz of alcohol per week. He reports that he does not use drugs.  Social History     Social History Narrative    Domestic life : Lives in private home with wife and 2 children        Mormonism : Religion        Sleep hygiene : In bed 10 PM to 6 AM for 8 hours of sleep        Caffeine use : 1 or 2 cups of coffee daily        Exercise habits : Jogs 3 miles 4 days weekly.  200 pushups 3  days weekly.          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  Medications Prior to Admission   Medication Sig Dispense Refill Last Dose   • buPROPion XL (WELLBUTRIN XL) 300 MG 24 hr tablet TAKE ONE TABLET BY MOUTH DAILY 30 tablet 1    • fluticasone (FLONASE) 50 MCG/ACT nasal spray 1 spray into each nostril Daily As Needed.   Taking       No Known Allergies    Objective   Objective     Vital Signs:   Temp:  [97.4 °F (36.3 °C)-97.7 °F (36.5 °C)] 97.7 °F (36.5 °C)  Heart Rate:  [77-93] 93  Resp:  [16-18] 16  BP: (151-184)/() 151/94        Physical Exam   Constitutional: He is oriented to person, place, and time. He appears well-developed and well-nourished. No distress.   HENT:   Head: Normocephalic and atraumatic.   Eyes: EOM are normal. Pupils are equal, round, and reactive to light.   Neck: Normal range of motion. Neck supple.   Cardiovascular: Normal rate, regular rhythm, normal heart sounds and intact distal pulses. Exam reveals no gallop and no friction rub.   No murmur heard.  Pulmonary/Chest: Effort normal and breath sounds normal. No stridor. No respiratory distress. He has no wheezes. He has no rales. He exhibits no tenderness.   Abdominal: Soft. Bowel sounds are normal. He exhibits no distension. There is no tenderness.   Musculoskeletal: Normal range of motion. He exhibits no edema.   Neurological: He is alert and oriented to person, place, and time. No cranial nerve deficit.   Skin: Skin is warm and dry. Capillary refill takes less than 2 seconds. No rash noted. He is not diaphoretic. No erythema. No pallor.   Psychiatric: He has a normal mood and affect. His behavior is normal. Judgment and thought content normal.    Nursing note and vitals reviewed.       Results Reviewed:    I have personally reviewed current lab, radiology, and data and agree.    Results from last 7 days   Lab Units 01/19/19  1620   WBC 10*3/mm3 7.89   HEMOGLOBIN g/dL 16.1   HEMATOCRIT % 44.9   PLATELETS 10*3/mm3 188   INR  1.09     Results from last 7 days   Lab Units 01/19/19  1837  01/19/19  1620   SODIUM mmol/L  --   --  140   POTASSIUM mmol/L  --   --  3.6   CHLORIDE mmol/L  --   --  105   CO2 mmol/L  --   --  28.0   BUN mg/dL  --   --  13   CREATININE mg/dL  --   --  1.17   GLUCOSE mg/dL  --   --  83   CALCIUM mg/dL  --   --  9.7   ALT (SGPT) U/L  --   --  26   AST (SGOT) U/L  --   --  41*   TROPONIN I ng/mL 2.24*   < >  --     < > = values in this interval not displayed.     Estimated Creatinine Clearance: 109.3 mL/min (by C-G formula based on SCr of 1.17 mg/dL).  Brief Urine Lab Results  (Last result in the past 365 days)      Color   Clarity   Blood   Leuk Est   Nitrite   Protein   CREAT   Urine HCG        03/08/18 1635 Yellow Clear Negative Negative Negative Negative             BNP   Date Value Ref Range Status   01/19/2019 6.0 0.0 - 100.0 pg/mL Final     Comment:     Results may be falsely decreased if patient taking Biotin.     Imaging Results (last 24 hours)     Procedure Component Value Units Date/Time    CT Angiogram Chest With Contrast [[id number]] Collected:  01/19/19 1920     Updated:  01/19/19 1957    Narrative:       EXAM:    CT Angiography Chest With Contrast     EXAM DATE/TIME:    1/19/2019 7:20 PM     CLINICAL HISTORY:    39 years old, male; Non-st elevation (nstemi) myocardial infarction; Abnormal   levels of other serum enzymes; Pain; Chest pain; Prior surgery; Surgery type:   Ablation; Additional info: Chest pain, acute, pe suspected, low pretest prob     TECHNIQUE:    Axial computed tomographic angiography images of the chest with intravenous   contrast using CT angiography protocol. Coronal reformatted images were   subsequently  obtained and reviewed.    All CT scans at this facility use at least one of these dose optimization   techniques: automated exposure control; mA and/or kV adjustment per patient   size (includes targeted exams where dose is matched to clinical indication); or   iterative reconstruction.    MIP reconstructed images were created and reviewed.     CONTRAST:    80 ml of iso 370 administered intravenously.     COMPARISON:    CR XR CHEST 1 VW 1/19/2019 4:32 PM     FINDINGS:    Pulmonary arteries:  No acute pulmonary embolus.    Aorta: Normal. No aortic aneurysm. No aortic dissection.    Lungs: Normal. No consolidation. No masses.    Pleural space: Normal. No pneumothorax. No pleural effusion.    Heart:  No cardiomegaly.    Lymph nodes:  Right hilar calcified lymph nodes.    Bones/joints: Unremarkable. No acute fracture.    Soft tissues: Unremarkable.       Impression:       No acute pulmonary embolus.    THIS DOCUMENT HAS BEEN ELECTRONICALLY SIGNED BY EMILIANO XIE MD    XR Chest 1 View [[id number]] Collected:  01/19/19 1834     Updated:  01/19/19 1834    Narrative:          EXAMINATION: XR CHEST 1 VW - 1/19/2019     INDICATION: Chest pain.     COMPARISON: NONE     FINDINGS: Cardiac size within normal limits. Prior granulomatous  involvement including calcified right hilar lymph nodes without focal  consolidation or opacification. No pneumothorax or significant effusion.  Degenerative changes of the spine.           Impression:       No acute cardiopulmonary process.     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 by Mahendra Jean, May 2001         Assessment & Plan    **NSTEMI  -CTA chest negative for embolism  -trend troponin/EKG overnight  -am labs to include: lipid panel, hem a1c, tsh, cbc, bmp, coags  -heparin drip and nitro drip started in ED; will continue and titrate per protocol  -telemetry  -plavix 300mg given in ED  -ASA 325mg given in ED  -started on BB, high dose statin  -NPO after midnight; IVF- NS continuous   -pain chest pain free at time of exam, treat/adjust plan PRN  -cardiology consult in AM    **Anxiety  -pt w/ history of anxiety, reports anxiety worse since coming to ED   -given ativan x 1 dose in ED w/ relief  -continue PRN ativan and bupropion    **Pre-hypertensive  -no history of needing routine antihypertensives  -elevated in ED; started on Nitro drip w/ small improvement  -titrate nitro gtt as needed  -started on BB per iform recommendations, (w/ hold parameters)  -monitor closely    **HLD  -not on routine meds??  -lipid panel w/ am labs  -high dose statin ordered for tonight       DVT prophylaxis:  Heparin     CODE STATUS:    Code Status and Medical Interventions:   Ordered at: 01/19/19 2027     Code Status:    CPR     Medical Interventions (Level of Support Prior to Arrest):    Full       Admission Status:  I believe this patient meets INPATIENT status due to the need for care which can only be reasonably provided in an hospital setting such as possible need for aggressive/expedited ancillary services and/or consultation services, IV medications, close physician monitoring, and/or procedures.  In such, I feel patient’s risk for adverse outcomes and need for care warrant INPATIENT evaluation and predict the patient’s care encounter to likely last beyond 2 midnights.    Electronically signed by OVIDIO Jennings, 01/19/19, 7:26 PM.    Brief Attending Admission Attestation     I have seen and examined  the patient, performing an independent face-to-face diagnostic evaluation with plan of care reviewed and developed with the advanced practice clinician (APC).      Brief Summary Statement/HPI:   Leon Carnes is a 39 y.o. male who denies history of coronary artery disease but has a very strong family history.  His father had multiple cardiac pathology including valvular disease, atrial fibrillation, cardiomyopathy with pacer and started for the age of 50. However, father is now . Patient presents to ER with complaint of Intermittent retrosternal chest pain which started couple of days ago. Pain is non radiating and there are no known aggravating and relieving factors. Patient still has a dull pain at the moment. Pain has not responded to NTG sublingual or drip. However, initial 2 troponins were 2.4 and 2.2 without ST-T changes. Patient denies any fever, chills, nausea, vomiting, upper respiratory symptoms including cough. Pain is non reproducible and there is no report of recent chest trauma. We will obtain the third cardiac enzyme and keep npo. Will request cardiology consult. Continue current line of treatment.      Attending Physical Exam:  Constitutional: No acute distress, awake, alert  Eyes: PERRLA, sclerae anicteric, no conjunctival injection  HENT: NCAT, mucous membranes moist  Neck: Supple, no thyromegaly, no lymphadenopathy, trachea midline  Respiratory: Clear to auscultation bilaterally, nonlabored respirations   Cardiovascular: RRR, no murmurs, rubs, or gallops, palpable pedal pulses bilaterally  Gastrointestinal: Positive bowel sounds, soft, nontender, nondistended  Musculoskeletal: No bilateral ankle edema, no clubbing or cyanosis to extremities  Psychiatric: Appropriate affect, cooperative  Neurologic: Oriented x 3, strength symmetric in all extremities, Cranial Nerves grossly intact, speech clear  Skin: No rashes      Brief Assessment/Plan :  See above for further detailed assessment and  plan developed with APC which I have reviewed and/or edited.      Electronically signed by Farzad Marmolejo MD, 01/19/19, 10:01 PM.

## 2019-01-20 NOTE — PLAN OF CARE
Problem: Patient Care Overview  Goal: Plan of Care Review  Outcome: Ongoing (interventions implemented as appropriate)   01/20/19 6005   Coping/Psychosocial   Plan of Care Reviewed With patient;spouse   Plan of Care Review   Progress no change   OTHER   Outcome Summary Pt rested well between care. VSS. NSR/SB. RA. Heparin and nitro gtts infusing. PRN ativan given. Changes noted on EKG, Dr. Marmolejo aware.

## 2019-01-20 NOTE — CONSULTS
Leon Carnes  [id number]  1980   LOS: 1 day   Patient Care Team:  PHYSICIAN: South Hollingsworth DO  ELECTROPHYSIOLOGIST: Mahendra Jean MD, [facility]  CARDIOLOGIST: Du Miner MD, [facility]    This is a 39-year-old  white male from [place], [place], works for [employer] as an  in [employer]    Chief Complaint:  NSTEMI    Problem List:  1. NSTEMI  A. Echocardiogram 2008: Mild LVH, trace MR, EF greater than 60%  B. Cardiac MRI approximately 2011: Interventricular septal thickness 11 mm, within normal limits  C. Echocardiogram 2/17/14: LVEF normal with normal diastolic parameters, normal functioning valves, no evidence of hypertrophic cardiomyopathy  D. CCS class II chest discomfort/NYHA class I shortness of breath with elevated troponins and transient abnormal EKG changes  2. Hyperlipidemia; ASCVD 10 year risk is 1.2%, 0.6% if treated  3. Hypertension  4. Remote AVNRT/RFA in May 2001  5. Anxiety  6. Family history of hypertrophic cardiomyopathy  7. Surgical history: AVNRT/RFA     No Known Allergies  Medications Prior to Admission   Medication Sig Dispense Refill Last Dose   • buPROPion XL (WELLBUTRIN XL) 300 MG 24 hr tablet TAKE ONE TABLET BY MOUTH DAILY 30 tablet 1    • fluticasone (FLONASE) 50 MCG/ACT nasal spray 1 spray into each nostril Daily As Needed.   Taking     Scheduled Meds:  atorvastatin 80 mg Oral Nightly   buPROPion  mg Oral Daily   metoprolol tartrate 25 mg Oral Q12H   pharmacy consult - MTM  Does not apply Daily   sodium chloride 3 mL Intravenous Q12H     Continuous Infusions:  heparin (porcine) 13 Units/kg/hr Last Rate: 13 Units/kg/hr (01/20/19 0039)   nitroglycerin 5-200 mcg/min Last Rate: 10 mcg/min (01/19/19 1757)   Pharmacy to Dose Heparin     sodium chloride 125 mL/hr Last Rate: 125 mL/hr (01/20/19 0542)          History of Present Illness:   This is a 39-year-old white male who presented to [facility] ED 1/19/19 with chest pressure that was intermittent throughout  "the day.  Pain woke him up in the morning and he took an Advil which helped temporarily but then the pain returned.  [facility] where an ECG was performed and he was told that it was \"abnormal\" and instructed to come to the [facility] ED for further evaluation.  He denied any shortness of breath, nausea, vomiting, diaphoresis, fever, chills, edema, radiation of chest pain, presyncope, syncope, or palpitations.  He has had costochondritis in the past and reports that at first the pain felt similar to that.  He had been working very hard in his basement the past few days and was wondering if he had just overworked himself and that was why his chest was sore.  He was not having any chest pain while he was doing his strenuous work.  Remotely he had an AVNRT ablation in 2001.  His troponins are downward trending.  He is active and jogs 3 miles 4 days a week and does 200 pushups 3 times a week.  ECG on admission demonstrated sinus rhythm with incomplete RBBB.  He has never been a smoker and states that he suffers from severe anxiety with his job and mourning his father who passed away 2 years ago.  He is on wellbutrin but his physician is thinking about switching him to a different antidepressant. He is having difficulties with his mom because she has a new boyfriend.  He denies illicit drug use.  No recent febrile illness, chills, pleurisy, hemoptysis, or /GI difficulty.  Currently asymptomatic at rest.    Cardiac risk factors: dyslipidemia and male gender.    Social History     Socioeconomic History   • Marital status:      Spouse name: Not on file   • Number of children: 2   • Years of education: Not on file   • Highest education level: Not on file   Social Needs   • Financial resource strain: Not on file   • Food insecurity - worry: Not on file   • Food insecurity - inability: Not on file   • Transportation needs - medical: Not on file   • Transportation needs - non-medical: Not on file " "  Occupational History   • Not on file   Tobacco Use   • Smoking status: Never Smoker   • Smokeless tobacco: Never Used   Substance and Sexual Activity   • Alcohol use: Yes     Alcohol/week: 1.2 oz     Types: 2 Glasses of wine per week   • Drug use: No   • Sexual activity: Yes     Partners: Female   Other Topics Concern   • Not on file   Social History Narrative    Domestic life : Lives in private home with wife and 2 children        Oriental orthodox : [employer]ism        Sleep hygiene : In bed 10 PM to 6 AM for 8 hours of sleep        Caffeine use : 1 or 2 cups of coffee daily        Exercise habits : Jogs 3 miles 4 days weekly.  200 pushups 3 days weekly.          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      Objective:       Physical Exam  /100   Pulse 58   Temp 97.7 °F (36.5 °C) (Oral)   Resp 16   Ht 188 cm (74\")   Wt 89.4 kg (197 lb 3.2 oz)   SpO2 99%   BMI 25.32 kg/m²       19   Weight: 90.7 kg (200 lb) 89.4 kg (197 lb 3.2 oz)     Body mass index is 25.32 kg/m².    Intake/Output Summary (Last 24 hours) at 2019 0941  Last data filed at 2019 0542  Gross per 24 hour   Intake 950 ml   Output --   Net 950 ml       General Appearance:  Alert, cooperative, no distress, appears stated age   Head:  Normocephalic, without obvious abnormality, atraumatic   Neck: Supple, symmetrical, trachea midline, no adenopathy, thyroid: not " enlarged, symmetric, no tenderness/mass/nodules, no carotid bruit or JVD   Lungs:   Clear to auscultation bilaterally, respirations unlabored   Heart:  Regular rate and rhythm, S1, S2 normal, no murmur, rub or gallop   Abdomen:   Soft, non-tender, no masses, no organomegaly, bowel sounds audible x4   Extremities: No edema, normal range of motion   Pulses: 2+ and symmetric   Skin: Skin color, texture, turgor normal, no rashes or lesions   Neurologic: Normal       Cardiographics:    · EK19:  Sinus rhythm with sinus arrhythmia with 1st degree AV block  Otherwise normal ECG  When compared with ECG of 2019 18:33,  No significant change was found; REVIEWED.    · Echocardiogram 19:  · Left ventricular systolic function is hyperdynamic (EF > 70).  · Adjacent to the left ventricle.  · Mild dilation of the sinuses of Valsalva is present.    Imaging:    · Chest x-ray 19: No acute cardiopulmonary process; REVIEWED.    · CTA chest 19: No acute PE    Lab Review:     Results from last 7 days   Lab Units 19  0446 19  1620   SODIUM mmol/L 139 140   POTASSIUM mmol/L 4.8 3.6   CHLORIDE mmol/L 109 105   CO2 mmol/L 27.0 28.0   BUN mg/dL 12 13   CREATININE mg/dL 1.01 1.17   GLUCOSE mg/dL 97 83   CALCIUM mg/dL 8.8 9.7     Results from last 7 days   Lab Units 19  0446 19  1620   WBC 10*3/mm3 8.32 7.89   HEMOGLOBIN g/dL 13.8 16.1   HEMATOCRIT % 40.3 44.9   PLATELETS 10*3/mm3 161 188     Results from last 7 days   Lab Units 19  0446   CHOLESTEROL mg/dL 185   TRIGLYCERIDES mg/dL 63   HDL CHOL mg/dL 53   LDL CHOL mg/dL 133*     Results from last 7 days   Lab Units 19  0446   HEMOGLOBIN A1C % 5.10     Results from last 7 days   Lab Units 19  0446 19  2231   TROPONIN I ng/mL 2.262* 3.145*   DRIPS:  · Heparin gtt 13 units/kg/hr  · Nitroglycerin gtt 10mcg/min      Assessment:      Patient with possible non-STEMI and downward trending troponin levels, negative CTA chest,   chest x-ray, and ECG demonstrating sinus rhythm with first-degree AV block and no ST-T wave changes. Echocardiogram acceptable.  I am perplexed by his presentation; differential includes acute coronary syndrome versus myopericarditis versus vasospastic coronary artery disease.  We will defer to Dr. Miner regarding a potential echocardiographic GXT versus LHC in the morning.  Concur with current medications and close observation and monitoring.  Concur with high dose atorvastatin at this point.     Plan:   1. Trend troponins  2. Hold metoprolol in am; defer to Dr. Miner on a potential echocardiographic GXT stress test versus LHC in morning  3. Cardiac diet  4. NPO after midnight except for medications  5. Continue heparin and nitro gtt's   6. ECG in morning    Discussed with patient, wife, and 2 additional family members in room.    Scribed for Christopher Cooper MD by Jennifer Landeros, OVIDIO. 1/20/2019  10:20 AM     I, Christopher Cooper MD, [facility], personally performed the services described in this documentation as scribed by the above named individual in my presence, and it is both accurate and complete.

## 2019-01-20 NOTE — PLAN OF CARE
Problem: Patient Care Overview  Goal: Plan of Care Review  Outcome: Ongoing (interventions implemented as appropriate)   01/20/19 7931   Coping/Psychosocial   Plan of Care Reviewed With patient   Plan of Care Review   Progress no change   OTHER   Outcome Summary VSS. Sinus sandy on monitor and RA. Heparin and nitro gtt. No complaints of pain, discomfort, or SOA. C vs Stress test tomorrow. NPO after midnight. Will continue to monitor.

## 2019-01-20 NOTE — PROGRESS NOTES
HEPARIN INFUSION  Leon Carnes is a  39 y.o. male receiving heparin infusion.     Therapy for (VTE/Cardiac):  Cardiac (NSTEMI)  Patient Weight: 90.7 kg  Initial Bolus (Y/N):   yes  Any Bolus (Y/N):   yes        Signs or Symptoms of Bleeding: no    Cardiac or Other (Not VTE)   Initial Bolus: 60 units/kg (Max 4,000 units)  Initial rate: 12 units/kg/hr (Max 1,000 units/hr)   Anti-Xa (IU/mL) Bolus Dose Stop Infusion Rate Change Repeat Anti-Xa      ?0.19 60 units/kg 0 hrs Increase rate by   4 units/kg/hr 6 hrs       0.2 - 0.29  30 units/kg 0 hrs Increase rate by 2 units/kg/hr 6 hrs    0.3 - 0.7 0 0 hrs No change 6 hrs      0.71 - 0.99 0  0 hrs Decrease rate by 2 units/kg/hr 6 hrs            ?1 0 Hold 1 hr Decrease rate by 3 units/kg/hr 6 hrs      Results from last 7 days   Lab Units 01/20/19  0446 01/19/19  1620   INR  1.12 1.09   HEMOGLOBIN g/dL 13.8 16.1   HEMATOCRIT % 40.3 44.9   PLATELETS 10*3/mm3 161 188       Date   Time   Anti-Xa Current Rate (Unit/kg/hr) Bolus   (Units) Rate Change   (Unit/kg/hr) New Rate (Unit/kg/hr) Next   Anti-Xa Comments  Pump Check Daily   1/19 1730 -- New start 4000 + 11 11 0000 Spoke with Rn   1/19 2231 0.24 11 - +2 13 0600 Stefanie [facility]   1/20 0446 0.37 13 -- -- 13 1200 Stefanie  [facility]   1/20 1128 0.44 13 -- -- 13 1800 D/w WONG Herman  Pump settings checked                                                                                                                                                                                                Ashley Avila, PharmD  Pharmacy Resident  1/20/2019  1:57 PM

## 2019-01-21 ENCOUNTER — DOCUMENTATION (OUTPATIENT)
Dept: CARDIAC REHAB | Facility: HOSPITAL | Age: 39
End: 2019-01-21

## 2019-01-21 VITALS
BODY MASS INDEX: 25.31 KG/M2 | SYSTOLIC BLOOD PRESSURE: 137 MMHG | HEART RATE: 80 BPM | RESPIRATION RATE: 16 BRPM | DIASTOLIC BLOOD PRESSURE: 85 MMHG | HEIGHT: 74 IN | TEMPERATURE: 97.5 F | OXYGEN SATURATION: 97 % | WEIGHT: 197.2 LBS

## 2019-01-21 PROBLEM — I40.1 ACUTE IDIOPATHIC MYOCARDITIS: Status: ACTIVE | Noted: 2019-01-19

## 2019-01-21 LAB
ANION GAP SERPL CALCULATED.3IONS-SCNC: 3 MMOL/L (ref 3–11)
BUN BLD-MCNC: 12 MG/DL (ref 9–23)
BUN/CREAT SERPL: 13.8 (ref 7–25)
CALCIUM SPEC-SCNC: 8.7 MG/DL (ref 8.7–10.4)
CHLORIDE SERPL-SCNC: 109 MMOL/L (ref 99–109)
CO2 SERPL-SCNC: 28 MMOL/L (ref 20–31)
CREAT BLD-MCNC: 0.87 MG/DL (ref 0.6–1.3)
GFR SERPL CREATININE-BSD FRML MDRD: 99 ML/MIN/1.73
GLUCOSE BLD-MCNC: 89 MG/DL (ref 70–100)
POTASSIUM BLD-SCNC: 4.2 MMOL/L (ref 3.5–5.5)
SODIUM BLD-SCNC: 140 MMOL/L (ref 132–146)
TROPONIN I SERPL-MCNC: 1.66 NG/ML
UFH PPP CHRO-ACNC: 0.42 IU/ML (ref 0.3–0.7)

## 2019-01-21 PROCEDURE — 25010000002 HEPARIN (PORCINE) PER 1000 UNITS

## 2019-01-21 PROCEDURE — 93458 L HRT ARTERY/VENTRICLE ANGIO: CPT | Performed by: INTERNAL MEDICINE

## 2019-01-21 PROCEDURE — 0 IOPAMIDOL PER 1 ML: Performed by: INTERNAL MEDICINE

## 2019-01-21 PROCEDURE — C1887 CATHETER, GUIDING: HCPCS | Performed by: INTERNAL MEDICINE

## 2019-01-21 PROCEDURE — 25010000002 HEPARIN (PORCINE) PER 1000 UNITS: Performed by: INTERNAL MEDICINE

## 2019-01-21 PROCEDURE — B2111ZZ FLUOROSCOPY OF MULTIPLE CORONARY ARTERIES USING LOW OSMOLAR CONTRAST: ICD-10-PCS | Performed by: INTERNAL MEDICINE

## 2019-01-21 PROCEDURE — B2151ZZ FLUOROSCOPY OF LEFT HEART USING LOW OSMOLAR CONTRAST: ICD-10-PCS | Performed by: INTERNAL MEDICINE

## 2019-01-21 PROCEDURE — 93010 ELECTROCARDIOGRAM REPORT: CPT | Performed by: INTERNAL MEDICINE

## 2019-01-21 PROCEDURE — 99239 HOSP IP/OBS DSCHRG MGMT >30: CPT | Performed by: NURSE PRACTITIONER

## 2019-01-21 PROCEDURE — 93005 ELECTROCARDIOGRAM TRACING: CPT | Performed by: NURSE PRACTITIONER

## 2019-01-21 PROCEDURE — C1894 INTRO/SHEATH, NON-LASER: HCPCS | Performed by: INTERNAL MEDICINE

## 2019-01-21 PROCEDURE — 25010000002 MIDAZOLAM PER 1 MG: Performed by: INTERNAL MEDICINE

## 2019-01-21 PROCEDURE — 25010000002 FENTANYL CITRATE (PF) 100 MCG/2ML SOLUTION: Performed by: INTERNAL MEDICINE

## 2019-01-21 PROCEDURE — 4A023N7 MEASUREMENT OF CARDIAC SAMPLING AND PRESSURE, LEFT HEART, PERCUTANEOUS APPROACH: ICD-10-PCS | Performed by: INTERNAL MEDICINE

## 2019-01-21 PROCEDURE — 80048 BASIC METABOLIC PNL TOTAL CA: CPT | Performed by: NURSE PRACTITIONER

## 2019-01-21 PROCEDURE — C1769 GUIDE WIRE: HCPCS | Performed by: INTERNAL MEDICINE

## 2019-01-21 PROCEDURE — 84484 ASSAY OF TROPONIN QUANT: CPT | Performed by: NURSE PRACTITIONER

## 2019-01-21 PROCEDURE — 85520 HEPARIN ASSAY: CPT

## 2019-01-21 RX ORDER — LISINOPRIL 10 MG/1
10 TABLET ORAL
Status: DISCONTINUED | OUTPATIENT
Start: 2019-01-21 | End: 2019-01-21 | Stop reason: HOSPADM

## 2019-01-21 RX ORDER — LIDOCAINE HYDROCHLORIDE 10 MG/ML
INJECTION, SOLUTION EPIDURAL; INFILTRATION; INTRACAUDAL; PERINEURAL AS NEEDED
Status: DISCONTINUED | OUTPATIENT
Start: 2019-01-21 | End: 2019-01-21 | Stop reason: HOSPADM

## 2019-01-21 RX ORDER — MIDAZOLAM HYDROCHLORIDE 1 MG/ML
INJECTION INTRAMUSCULAR; INTRAVENOUS AS NEEDED
Status: DISCONTINUED | OUTPATIENT
Start: 2019-01-21 | End: 2019-01-21 | Stop reason: HOSPADM

## 2019-01-21 RX ORDER — FAMOTIDINE 20 MG/1
20 TABLET, FILM COATED ORAL 2 TIMES DAILY
Qty: 10 TABLET | Refills: 0 | Status: SHIPPED | OUTPATIENT
Start: 2019-01-21 | End: 2019-01-26

## 2019-01-21 RX ORDER — IBUPROFEN 600 MG/1
600 TABLET ORAL EVERY 8 HOURS
Qty: 15 TABLET | Refills: 0 | Status: SHIPPED | OUTPATIENT
Start: 2019-01-21 | End: 2019-01-26

## 2019-01-21 RX ORDER — SODIUM CHLORIDE 9 MG/ML
1-3 INJECTION, SOLUTION INTRAVENOUS CONTINUOUS
Status: DISCONTINUED | OUTPATIENT
Start: 2019-01-21 | End: 2019-01-21

## 2019-01-21 RX ORDER — FENTANYL CITRATE 50 UG/ML
INJECTION, SOLUTION INTRAMUSCULAR; INTRAVENOUS AS NEEDED
Status: DISCONTINUED | OUTPATIENT
Start: 2019-01-21 | End: 2019-01-21 | Stop reason: HOSPADM

## 2019-01-21 RX ADMIN — HEPARIN SODIUM 13 UNITS/KG/HR: 10000 INJECTION, SOLUTION INTRAVENOUS at 11:39

## 2019-01-21 RX ADMIN — BUPROPION HYDROCHLORIDE 300 MG: 150 TABLET, FILM COATED, EXTENDED RELEASE ORAL at 08:31

## 2019-01-21 RX ADMIN — SODIUM CHLORIDE 1 ML/KG/HR: 9 INJECTION, SOLUTION INTRAVENOUS at 13:25

## 2019-01-21 RX ADMIN — SODIUM CHLORIDE 125 ML/HR: 9 INJECTION, SOLUTION INTRAVENOUS at 11:39

## 2019-01-21 RX ADMIN — SODIUM CHLORIDE 125 ML/HR: 9 INJECTION, SOLUTION INTRAVENOUS at 04:30

## 2019-01-21 NOTE — PLAN OF CARE
Problem: Patient Care Overview  Goal: Plan of Care Review  Outcome: Ongoing (interventions implemented as appropriate)   01/21/19 0519   Coping/Psychosocial   Plan of Care Reviewed With patient;spouse   Plan of Care Review   Progress no change   OTHER   Outcome Summary Pt rested well this shift. VSS. Sinus sandy. RA. Heparin and nitro gtts infusing. Does not report any chest pain. [facility] today per Dr. Miner.

## 2019-01-21 NOTE — PROGRESS NOTES
HEPARIN INFUSION  Leon Carnes is a  39 y.o. male receiving heparin infusion.     Therapy for (VTE/Cardiac):  Cardiac (NSTEMI)  Patient Weight: 90.7 kg  Initial Bolus (Y/N):   yes  Any Bolus (Y/N):   yes        Signs or Symptoms of Bleeding: no    Cardiac or Other (Not VTE)   Initial Bolus: 60 units/kg (Max 4,000 units)  Initial rate: 12 units/kg/hr (Max 1,000 units/hr)   Anti-Xa (IU/mL) Bolus Dose Stop Infusion Rate Change Repeat Anti-Xa      ?0.19 60 units/kg 0 hrs Increase rate by   4 units/kg/hr 6 hrs       0.2 - 0.29  30 units/kg 0 hrs Increase rate by 2 units/kg/hr 6 hrs    0.3 - 0.7 0 0 hrs No change 6 hrs      0.71 - 0.99 0  0 hrs Decrease rate by 2 units/kg/hr 6 hrs            ?1 0 Hold 1 hr Decrease rate by 3 units/kg/hr 6 hrs      Results from last 7 days   Lab Units 01/20/19  0446 01/19/19  1620   INR  1.12 1.09   HEMOGLOBIN g/dL 13.8 16.1   HEMATOCRIT % 40.3 44.9   PLATELETS 10*3/mm3 161 188       Date   Time   Anti-Xa Current Rate (Unit/kg/hr) Bolus   (Units) Rate Change   (Unit/kg/hr) New Rate (Unit/kg/hr) Next   Anti-Xa Comments  Pump Check Daily   1/19 1730 -- New start 4000 + 11 11 0000 Spoke with Rn   1/19 2231 0.24 11 - +2 13 0600 Stefanie [facility]   1/20 0446 0.37 13 -- -- 13 1200 Stefanie [facility]   1/20 1128 0.44 13 -- -- 13 1800 D/w WONG Herman  Pump settings checked   1/20 1751 0.32 13 -- -- 13 0600 D/w WONG Watts   1/21 0500 0.42 13 -- -- 13 0600 Batsheva [id number], verified                                                                                                                                                                          oJe [facility]   1/20/2019  7:31 PM

## 2019-01-21 NOTE — PROGRESS NOTES
Discharge Planning Assessment  [facility]     Patient Name: Leon Carnes  MRN: [id number]  Today's Date: 1/21/2019    Admit Date: 1/19/2019    Discharge Needs Assessment     Row Name 01/21/19 0856       Living Environment    Lives With  spouse;child(christopher), dependent    Current Living Arrangements  home/apartment/condo    Primary Care Provided by  self    Provides Primary Care For  no one    Family Caregiver if Needed  spouse    Quality of Family Relationships  involved;helpful    Able to Return to Prior Arrangements  yes       Transition Planning    Patient/Family Anticipates Transition to  home with family    Patient/Family Anticipated Services at Transition  none    Transportation Anticipated  family or friend will provide       Discharge Needs Assessment    Readmission Within the Last 30 Days  no previous admission in last 30 days    Concerns to be Addressed  no discharge needs identified;denies needs/concerns at this time    Equipment Currently Used at Home  none    Anticipated Changes Related to Illness  none    Equipment Needed After Discharge  none        Discharge Plan     Row Name 01/21/19 0856       Plan    Plan  home    Patient/Family in Agreement with Plan  yes    Plan Comments  Pt lives in [place] [facility] [place] with his wife. He reports he is independent with all ADLs and denies use of any DME/HH. He is followed by his PCP and his medications are covered by insurance. At this time pt reports his plan for discharge is to return home, he denies all discharge needs at this time.        Destination      No service coordination in this encounter.      Durable Medical Equipment      No service coordination in this encounter.      Dialysis/Infusion      No service coordination in this encounter.      Home Medical Care      No service coordination in this encounter.      Community Resources      No service coordination in this encounter.        Expected Discharge Date and Time     Expected Discharge Date Expected  Discharge Time    Jan 22, 2019         Demographic Summary     Row Name 01/21/19 0855       General Information    Admission Type  inpatient    Referral Source  physician    General Information Comments  PCP [place]       Contact Information    Permission Granted to Share Info With  ;family/designee    Contact Information Comments  Ana Luisa Carnes [phone]        Functional Status     Row Name 01/21/19 0856       Functional Status, IADL    Medications  independent    Meal Preparation  independent    Housekeeping  independent    Laundry  independent    Shopping  independent       Mental Status    General Appearance WDL  WDL       Mental Status Summary    Recent Changes in Mental Status/Cognitive Functioning  no changes        Psychosocial    No documentation.       Abuse/Neglect    No documentation.       Legal    No documentation.       Substance Abuse    No documentation.       Patient Forms    No documentation.           Vandana Parks, RN

## 2019-01-21 NOTE — DISCHARGE SUMMARY
[facility] Medicine Services  DISCHARGE SUMMARY    Patient Name: Leon Carnes  : 1980  MRN: [id number]    Date of Admission: 2019  Date of Discharge:  2019  Primary Care Physician: [place] DO    Consults     Date and Time Order Name Status Description    2019 0030 Inpatient Cardiology Consult Completed           Hospital Course     Presenting Problem:   Elevated troponin [R74.8]    Active Hospital Problems    Diagnosis Date Noted   • **Acute idiopathic myocarditis [I40.1] 2019   • Prehypertension [R03.0] 2017   • Anxiety [F41.9] 2016      Resolved Hospital Problems    Diagnosis Date Noted Date Resolved   • Elevated troponin [R74.8] 2019          Hospital Course:  Leon Carnes is a 39 y.o. male with no known hx of CAD but significant family hx of CAD.  Father had multiple cardiac issues including VHD, Afib and CM with pacer and all started at the age of 50.  Now .  Patient presents to ER with complaint of Intermittent retrosternal chest pain which started couple of days prior. Pain is non radiating and there are no known aggravating and relieving factors. Patient still had dull pain on initial admit. Initial 2 troponins were 2.4 and 2.2 without ST-T changes. Patient denied any fever, chills, nausea, vomiting, upper respiratory symptoms including cough. Pain was non reproducible and there were no reports of recent chest trauma. He was admitted to hospital medicine service.  Cardiology was consulted. Pts troponin peaked at 3.145 at 2231 pm on .  He was continued on nitro and heparin gtts.  He had been cp free since sometime  morning.      This am pt went for heart cath per Dr Miner.  [place] to be a negative cath. Echo okay.  Dr Miner follows pt outpt.  He felt this to be a likely viral induced myocarditis.  Pt was started on ibuprofen 600 mg po tid until f/u with cards.  He is seen again this  afternoon at 1315 pm after just returning from cath lab. Family at .  Pt is awake and alert.  In good spirits.  CP free.  He is currently hemodynamically stable and afebrile.  He has been cleared for dc home per cards after pressure band removed this afternoon.  He will dc on his current home meds with the addition of ibuprofen 600 mg tid until cards f/u.  F/u with PCP 1 week.  Return to ER for any worsening.  Wife at  agrees to plan.        Discharge Follow Up Recommendations for labs/diagnostics:  S/p heart cath today per Dr Miner.  Okay to dc home per tamara on ibuprofen 600 mg tid.  F/u Dr Miner in 4 weeks.  F/u PCP 1 week.     Day of Discharge     HPI:   Pt is seen initially at 0905 am today resting up in chair in NAD.  Wife and mother at .  He states he feels okay.  Denies any cp, soa, dizziness, n/v, or abd pain.  NPO since MN for planned heart cath today by Dr Miner.  States no cp since yesterday am.  Ambulating independent.  Feels nervous but no other issues expressed.     Addendum 1315 pm  Pt just back from cath lab.  Negative heart cath.  Ok to dc home from cards standpoint on ibuprofen 600mg tid until f/u appt with cards.  Cards feels this was cp related to a viral induced myocarditis.  I spoke with pt and wife on his return to his room.  He feels good.  Both feel ready for dc home.  Happy it was a negative cath.      Review of Systems  Gen- No fevers, chills  CV- No chest pain, palpitations  Resp- No cough, dyspnea  GI- No N/V/D, abd pain      Otherwise ROS is negative except as mentioned in the HPI.    Vital Signs:   Temp:  [97.5 °F (36.4 °C)-98 °F (36.7 °C)] 97.5 °F (36.4 °C)  Heart Rate:  [51-80] 80  Resp:  [16] 16  BP: (100-168)/() 137/85     Physical Exam:  Constitutional: No acute distress, awake, alert.  Sitting up in chair with wife and mother at bedside.   HENT: NCAT, mucous membranes moist  Respiratory: Clear to auscultation bilaterally A/P, respiratory effort normal on  RA  Cardiovascular: RRR, no murmurs, rubs, or gallops, palpable pedal pulses bilaterally  Gastrointestinal: Positive bowel sounds, soft, nontender, nondistended  Musculoskeletal: No bilateral ankle edema.  BUCHANAN spontaneously   Psychiatric: Appropriate affect, cooperative and very pleasant.  Mildly, appropriately anxious  Neurologic: Oriented x 3, strength symmetric in all extremities, Cranial Nerves grossly intact to confrontation, speech clear and appropriate.  Follows commands   Skin: No rashes      Pertinent  and/or Most Recent Results     Results from last 7 days   Lab Units 01/21/19  0555 01/20/19  0446 01/19/19  1620   WBC 10*3/mm3  --  8.32 7.89   HEMOGLOBIN g/dL  --  13.8 16.1   HEMATOCRIT %  --  40.3 44.9   PLATELETS 10*3/mm3  --  161 188   SODIUM mmol/L 140 139 140   POTASSIUM mmol/L 4.2 4.8 3.6   CHLORIDE mmol/L 109 109 105   CO2 mmol/L 28.0 27.0 28.0   BUN mg/dL 12 12 13   CREATININE mg/dL 0.87 1.01 1.17   GLUCOSE mg/dL 89 97 83   CALCIUM mg/dL 8.7 8.8 9.7     Results from last 7 days   Lab Units 01/20/19  0446 01/19/19  1620   BILIRUBIN mg/dL  --  0.8   ALK PHOS U/L  --  27   ALT (SGPT) U/L  --  26   AST (SGOT) U/L  --  41*   PROTIME Seconds 13.9 13.5   INR  1.12 1.09   APTT seconds  --  36.2*     Results from last 7 days   Lab Units 01/20/19  0446 01/19/19  1620   CHOLESTEROL mg/dL 185  --    TRIGLYCERIDES mg/dL 63 118   HDL CHOL mg/dL 53  --      Results from last 7 days   Lab Units 01/21/19  0555 01/20/19  0446 01/19/19  2231 01/19/19  1837  01/19/19  1620   TSH mIU/mL  --  1.326  --   --   --   --    HEMOGLOBIN A1C %  --  5.10  --   --   --   --    BNP pg/mL  --   --   --   --   --  6.0   TROPONIN I ng/mL 1.659* 2.262* 3.145* 2.24*   < >  --     < > = values in this interval not displayed.       Brief Urine Lab Results  (Last result in the past 365 days)      Color   Clarity   Blood   Leuk Est   Nitrite   Protein   CREAT   Urine HCG        03/08/18 1635 Yellow Clear Negative Negative Negative  Negative               Microbiology Results Abnormal     None          Imaging Results (all)     Procedure Component Value Units Date/Time    XR Chest 1 View [[id number]] Collected:  01/19/19 1834     Updated:  01/21/19 0848    Narrative:          EXAMINATION: XR CHEST 1 VW - 1/19/2019     INDICATION: Chest pain.     COMPARISON: NONE     FINDINGS: Cardiac size within normal limits. Prior granulomatous  involvement including calcified right hilar lymph nodes without focal  consolidation or opacification. No pneumothorax or significant effusion.  Degenerative changes of the spine.           Impression:       No acute cardiopulmonary process.     DICTATED:   1/19/2019  EDITED/ls :   1/19/2019      This report was finalized on 1/21/2019 8:45 AM by Dr. Jermaine Nye.       CT Angiogram Chest With Contrast [[id number]] Collected:  01/19/19 1920     Updated:  01/19/19 1957    Narrative:       EXAM:    CT Angiography Chest With Contrast     EXAM DATE/TIME:    1/19/2019 7:20 PM     CLINICAL HISTORY:    39 years old, male; Non-st elevation (nstemi) myocardial infarction; Abnormal   levels of other serum enzymes; Pain; Chest pain; Prior surgery; Surgery type:   Ablation; Additional info: Chest pain, acute, pe suspected, low pretest prob     TECHNIQUE:    Axial computed tomographic angiography images of the chest with intravenous   contrast using CT angiography protocol. Coronal reformatted images were   subsequently obtained and reviewed.    All CT scans at this facility use at least one of these dose optimization   techniques: automated exposure control; mA and/or kV adjustment per patient   size (includes targeted exams where dose is matched to clinical indication); or   iterative reconstruction.    MIP reconstructed images were created and reviewed.     CONTRAST:    80 ml of iso 370 administered intravenously.     COMPARISON:    CR XR CHEST 1 VW 1/19/2019 4:32 PM     FINDINGS:    Pulmonary arteries:  No acute pulmonary  embolus.    Aorta: Normal. No aortic aneurysm. No aortic dissection.    Lungs: Normal. No consolidation. No masses.    Pleural space: Normal. No pneumothorax. No pleural effusion.    Heart:  No cardiomegaly.    Lymph nodes:  Right hilar calcified lymph nodes.    Bones/joints: Unremarkable. No acute fracture.    Soft tissues: Unremarkable.       Impression:       No acute pulmonary embolus.    THIS DOCUMENT HAS BEEN ELECTRONICALLY SIGNED BY EMILIANO XIE MD                    Results for orders placed during the hospital encounter of 01/19/19   Adult Transthoracic Echo Complete W/ Cont if Necessary Per Protocol    Narrative · Left ventricular systolic function is hyperdynamic (EF > 70).  · Adjacent to the left ventricle.  · Mild dilation of the sinuses of Valsalva is present.            Discharge Details        Discharge Medications      New Medications      Instructions Start Date   famotidine 20 MG tablet  Commonly known as:  PEPCID   20 mg, Oral, 2 Times Daily      ibuprofen 600 MG tablet  Commonly known as:  ADVIL,MOTRIN   600 mg, Oral, Every 8 Hours         Continue These Medications      Instructions Start Date   buPROPion  MG 24 hr tablet  Commonly known as:  WELLBUTRIN XL   TAKE ONE TABLET BY MOUTH DAILY      fluticasone 50 MCG/ACT nasal spray  Commonly known as:  FLONASE   1 spray, Nasal, Daily PRN             No Known Allergies      Discharge Disposition:  Home or Self Care    Discharge Diet:  Diet Order   Procedures   • Diet Regular         Discharge Activity:   Activity Instructions     Activity as Tolerated              CODE STATUS:    Code Status and Medical Interventions:   Ordered at: 01/19/19 2027     Code Status:    CPR     Medical Interventions (Level of Support Prior to Arrest):    Full         Future Appointments   Date Time Provider Department Center   1/29/2019  8:00 AM [facility] DO [facility] None   3/5/2019 10:30 AM Filiberto Miner IV, MD [facility] None   3/11/2019   8:15 AM [place] [facility] DO [facility] None   4/30/2019  9:30 AM Filiberto Miner IV, MD [facility] None       Additional Instructions for the Follow-ups that You Need to Schedule     Discharge Follow-up with PCP   As directed       Currently Documented PCP:    [place] DO    PCP Phone Number:    [phone]     Follow Up Details:  1 week         Discharge Follow-up with Specialty: Dr Miner per his recs in 4 weeks   As directed      Specialty:  Dr Miner per his recs in 4 weeks         Discharge Follow-up with Specialty: Du Miner; 1 Month   As directed      Specialty:  Du Miner    Follow Up:  1 Month    Follow Up Details:  Hospital FU for elevated troponin, myocarditis               Time Spent on Discharge:  45 minutes    Electronically signed by OVIDIO Ortega, 01/21/19, 1:39 PM.

## 2019-01-21 NOTE — PLAN OF CARE
Problem: Patient Care Overview  Goal: Plan of Care Review  Outcome: Ongoing (interventions implemented as appropriate)   01/21/19 7856   Coping/Psychosocial   Plan of Care Reviewed With patient   Plan of Care Review   Progress improving   OTHER   Outcome Summary VSS. Sinus sandy to NSR on monitor. Pt had LHC today with no interventions/clean cath. Removing TR band per protocol. No complaints of complications at this time. Will discharge post-TR band removal.

## 2019-01-22 ENCOUNTER — TRANSITIONAL CARE MANAGEMENT TELEPHONE ENCOUNTER (OUTPATIENT)
Dept: FAMILY MEDICINE CLINIC | Facility: CLINIC | Age: 39
End: 2019-01-22

## 2019-01-22 NOTE — OUTREACH NOTE
"DAVID call completed.  Please refer to TCM call flowsheet for call documentation.    The patient states he is doing well today and feeling better. He states he continues with \"a little chest pain\" which he states he was told to expect due to inflammation. He is taking ibuprofen as prescribed which is helping to control pain. He denies any fever, chills, n/v/d, palps, or tachycardia. He is able to eat and drink. Bowels are moving. Urinating without difficulty. Pt states cath insertion site mildly sore but denies any increase in pain, swelling, or bruising. DAVID nurse reschedule DAVID appt for 1/28/19 9:45am to allow for 30 minute visit with PCP. Pt denies any questions, concerns, or needs at time of call. Pt voiced appreciation for the f/u. TCM telephone encounter routed to [facility] [address] [facility] as per protocol.    "

## 2019-01-22 NOTE — PAYOR COMM NOTE
"Leon Carnes (39 y.o. Male)   Ref # [id number]  Cally Veliz RN, BSN  Phone # [phone]  Fax # [phone]    Date of Birth Social Security Number Address Home Phone MRN    1980  [address] [id number] [phone] [id number]    Jewish Marital Status          Unknown        Admission Date Admission Type Admitting Provider Attending Provider Department, Room/Bed    19 Emergency Arvind Jaime MD  [facility] 6A, N605/1    Discharge Date Discharge Disposition Discharge Destination        2019 Home or Self Care              Attending Provider:  (none)   Allergies:  No Known Allergies    Isolation:  None   Infection:  None   Code Status:  Prior    Ht:  188 cm (74\")   Wt:  89.4 kg (197 lb 3.2 oz)    Admission Cmt:  None   Principal Problem:  Acute idiopathic myocarditis [I40.1] More...                 Active Insurance as of 2019     Primary Coverage     Payor Plan Insurance Group Employer/Plan Group    ANTHEM BLUE CROSS [facility] [employer] [facility] PPO [id number]     Payor Plan Address Payor Plan Phone Number Payor Plan Fax Number Effective Dates    [address] [phone]  2018 - None Entered    [place] [address]       Subscriber Name Subscriber Birth Date Member ID       LEON CARNES 1980 [id number]                 Emergency Contacts      (Rel.) Home Phone Work Phone Mobile Phone    Ana Luisa Carnes (Spouse) [phone] -- --               History & Physical      Farzad Marmolejo MD at 2019  7:26 PM              [facility] Services  HISTORY AND PHYSICAL    Patient Name: Leon Carnes  : 1980  MRN: [id number]  Primary Care Physician: [place] DO  Date of admission: 2019      Subjective   Subjective     Chief Complaint:  Chest pain     HPI:  Leon Carnes is a 39 y.o. male with no significant past medical history who presented to the ED w/ c/o " "chest pain/pressure. Pt reports that Friday morning he woke up with central chest discomfort and pressure. He reports that the pressure was intermittent throughout the day. Pain/discomfort located central chest/sternal region. On Saturday at 4am the pain woke him up. He took an advil which did help the pain a little but then returned. He went to the [facility] where an EKG was done. He was told that the EKG was \"abnormal\" and was instructed to come to the ED for further evaluation. Patient denies associated symptoms such as shortness of breath, nausea/vomiting, diaphoresis or radiation of pain. At its worst he reports the pain at a 3-4/10. He had costochondritis once several years ago and reports that initially the pain felt similar to that episode but was not as intense. He reports that he has been working on a project at his home and thought he \"over did it\" but when the pain persisted he felt that he needed further evaluation. He has no history of HTN or cardiac issues other than SVT s/p ablation in . Pt denies recent illness, fever/chills, N/V/D, abdominal pain, palpitations, dyspnea, cough, dysuria, edema, syncope, headache. Pt evaluated in the ED, first troponin was elevated at 2.42. Pt admitted to the hospital medicine service for further evaluation.       **Pt's father w/ a history of valvular dz s/p replacement, afib, cardiomyopathy w/ pacer, now . Maternal grandfather w/ hx of CAD, AMI. No other familial history of heart disease.     Review of Systems   Constitutional: Negative.  Negative for chills, diaphoresis and fatigue.   HENT: Negative.    Eyes: Negative.    Respiratory: Negative.  Negative for cough and shortness of breath.    Cardiovascular: Positive for chest pain. Negative for palpitations and leg swelling.        Pressure- located central chest x 2 days. No associated symptoms.    Gastrointestinal: Negative.    Endocrine: Negative.    Genitourinary: Negative.    Musculoskeletal: Negative.  "   Skin: Negative.    Allergic/Immunologic: Negative.    Neurological: Negative.    Hematological: Negative.    Psychiatric/Behavioral:        Anxiety.   All other systems reviewed and are negative.       Otherwise complete ROS reviewed and is negative except as mentioned in the HPI.    Personal History     Past Medical History:   Diagnosis Date   • Allergic rhinitis Lifelong   • Anxiety 2015    Recurrent anxiety and distress at home   • Anxiety    • AV yudi re-entry tachycardia (CMS/HCC) 2011    RFA - successful   • Closed left ankle fracture 1988   • Costochondritis    • Hyperlipidemia 2016    Cholesterol 134   • Prehypertension 2016    Average blood pressure 125/85       Past Surgical History:   Procedure Laterality Date   • RADIOFREQUENCY ABLATION  2011    AV nod tach       Family History: family history includes Heart disease in his maternal grandfather; Hypertension in his father; Hypertrophic cardiomyopathy in his father; No Known Problems in his mother. Otherwise pertinent FHx was reviewed and unremarkable.     Social History:  reports that  has never smoked. he has never used smokeless tobacco. He reports that he drinks about 1.2 oz of alcohol per week. He reports that he does not use drugs.  Social History     Social History Narrative    Domestic life : Lives in private home with wife and 2 children        Nondenominational : Religious        Sleep hygiene : In bed 10 PM to 6 AM for 8 hours of sleep        Caffeine use : 1 or 2 cups of coffee daily        Exercise habits : Jogs 3 miles 4 days weekly.  200 pushups 3 days weekly.          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  Medications Prior to Admission   Medication Sig Dispense  Refill Last Dose   • buPROPion XL (WELLBUTRIN XL) 300 MG 24 hr tablet TAKE ONE TABLET BY MOUTH DAILY 30 tablet 1    • fluticasone (FLONASE) 50 MCG/ACT nasal spray 1 spray into each nostril Daily As Needed.   Taking       No Known Allergies    Objective   Objective     Vital Signs:   Temp:  [97.4 °F (36.3 °C)-97.7 °F (36.5 °C)] 97.7 °F (36.5 °C)  Heart Rate:  [77-93] 93  Resp:  [16-18] 16  BP: (151-184)/() 151/94        Physical Exam   Constitutional: He is oriented to person, place, and time. He appears well-developed and well-nourished. No distress.   HENT:   Head: Normocephalic and atraumatic.   Eyes: EOM are normal. Pupils are equal, round, and reactive to light.   Neck: Normal range of motion. Neck supple.   Cardiovascular: Normal rate, regular rhythm, normal heart sounds and intact distal pulses. Exam reveals no gallop and no friction rub.   No murmur heard.  Pulmonary/Chest: Effort normal and breath sounds normal. No stridor. No respiratory distress. He has no wheezes. He has no rales. He exhibits no tenderness.   Abdominal: Soft. Bowel sounds are normal. He exhibits no distension. There is no tenderness.   Musculoskeletal: Normal range of motion. He exhibits no edema.   Neurological: He is alert and oriented to person, place, and time. No cranial nerve deficit.   Skin: Skin is warm and dry. Capillary refill takes less than 2 seconds. No rash noted. He is not diaphoretic. No erythema. No pallor.   Psychiatric: He has a normal mood and affect. His behavior is normal. Judgment and thought content normal.   Nursing note and vitals reviewed.       Results Reviewed:    I have personally reviewed current lab, radiology, and data and agree.    Results from last 7 days   Lab Units 01/19/19  1620   WBC 10*3/mm3 7.89   HEMOGLOBIN g/dL 16.1   HEMATOCRIT % 44.9   PLATELETS 10*3/mm3 188   INR  1.09     Results from last 7 days   Lab Units 01/19/19  1837  01/19/19  1620   SODIUM mmol/L  --   --  140   POTASSIUM mmol/L   --   --  3.6   CHLORIDE mmol/L  --   --  105   CO2 mmol/L  --   --  28.0   BUN mg/dL  --   --  13   CREATININE mg/dL  --   --  1.17   GLUCOSE mg/dL  --   --  83   CALCIUM mg/dL  --   --  9.7   ALT (SGPT) U/L  --   --  26   AST (SGOT) U/L  --   --  41*   TROPONIN I ng/mL 2.24*   < >  --     < > = values in this interval not displayed.     Estimated Creatinine Clearance: 109.3 mL/min (by C-G formula based on SCr of 1.17 mg/dL).  Brief Urine Lab Results  (Last result in the past 365 days)      Color   Clarity   Blood   Leuk Est   Nitrite   Protein   CREAT   Urine HCG        03/08/18 1635 Yellow Clear Negative Negative Negative Negative             BNP   Date Value Ref Range Status   01/19/2019 6.0 0.0 - 100.0 pg/mL Final     Comment:     Results may be falsely decreased if patient taking Biotin.     Imaging Results (last 24 hours)     Procedure Component Value Units Date/Time    CT Angiogram Chest With Contrast [[id number]] Collected:  01/19/19 1920     Updated:  01/19/19 1957    Narrative:       EXAM:    CT Angiography Chest With Contrast     EXAM DATE/TIME:    1/19/2019 7:20 PM     CLINICAL HISTORY:    39 years old, male; Non-st elevation (nstemi) myocardial infarction; Abnormal   levels of other serum enzymes; Pain; Chest pain; Prior surgery; Surgery type:   Ablation; Additional info: Chest pain, acute, pe suspected, low pretest prob     TECHNIQUE:    Axial computed tomographic angiography images of the chest with intravenous   contrast using CT angiography protocol. Coronal reformatted images were   subsequently obtained and reviewed.    All CT scans at this facility use at least one of these dose optimization   techniques: automated exposure control; mA and/or kV adjustment per patient   size (includes targeted exams where dose is matched to clinical indication); or   iterative reconstruction.    MIP reconstructed images were created and reviewed.     CONTRAST:    80 ml of iso 370 administered intravenously.      COMPARISON:    CR XR CHEST 1 VW 1/19/2019 4:32 PM     FINDINGS:    Pulmonary arteries:  No acute pulmonary embolus.    Aorta: Normal. No aortic aneurysm. No aortic dissection.    Lungs: Normal. No consolidation. No masses.    Pleural space: Normal. No pneumothorax. No pleural effusion.    Heart:  No cardiomegaly.    Lymph nodes:  Right hilar calcified lymph nodes.    Bones/joints: Unremarkable. No acute fracture.    Soft tissues: Unremarkable.       Impression:       No acute pulmonary embolus.    THIS DOCUMENT HAS BEEN ELECTRONICALLY SIGNED BY EMILIANO XIE MD    XR Chest 1 View [[id number]] Collected:  01/19/19 1834     Updated:  01/19/19 1834    Narrative:          EXAMINATION: XR CHEST 1 VW - 1/19/2019     INDICATION: Chest pain.     COMPARISON: NONE     FINDINGS: Cardiac size within normal limits. Prior granulomatous  involvement including calcified right hilar lymph nodes without focal  consolidation or opacification. No pneumothorax or significant effusion.  Degenerative changes of the spine.           Impression:       No acute cardiopulmonary process.     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 by Mahendra Jean, May 2001         Assessment & Plan    **NSTEMI  -CTA chest negative for embolism  -trend troponin/EKG overnight  -am labs to include: lipid panel, hem a1c, tsh, cbc, bmp, coags  -heparin drip and nitro drip started in ED; will continue and titrate per protocol  -telemetry  -plavix 300mg given in  ED  -ASA 325mg given in ED  -started on BB, high dose statin  -NPO after midnight; IVF- NS continuous   -pain chest pain free at time of exam, treat/adjust plan PRN  -cardiology consult in AM    **Anxiety  -pt w/ history of anxiety, reports anxiety worse since coming to ED   -given ativan x 1 dose in ED w/ relief  -continue PRN ativan and bupropion    **Pre-hypertensive  -no history of needing routine antihypertensives  -elevated in ED; started on Nitro drip w/ small improvement  -titrate nitro gtt as needed  -started on BB per iform recommendations, (w/ hold parameters)  -monitor closely    **HLD  -not on routine meds??  -lipid panel w/ am labs  -high dose statin ordered for tonight       DVT prophylaxis:  Heparin     CODE STATUS:    Code Status and Medical Interventions:   Ordered at: 01/19/19 2027     Code Status:    CPR     Medical Interventions (Level of Support Prior to Arrest):    Full       Admission Status:  I believe this patient meets INPATIENT status due to the need for care which can only be reasonably provided in an hospital setting such as possible need for aggressive/expedited ancillary services and/or consultation services, IV medications, close physician monitoring, and/or procedures.  In such, I feel patient’s risk for adverse outcomes and need for care warrant INPATIENT evaluation and predict the patient’s care encounter to likely last beyond 2 midnights.    Electronically signed by OVIDIO Jennings, 01/19/19, 7:26 PM.    Brief Attending Admission Attestation     I have seen and examined the patient, performing an independent face-to-face diagnostic evaluation with plan of care reviewed and developed with the advanced practice clinician (APC).      Brief Summary Statement/HPI:   Leon Carnes is a 39 y.o. male who denies history of coronary artery disease but has a very strong family history.  His father had multiple cardiac pathology including valvular disease, atrial fibrillation,  cardiomyopathy with pacer and started for the age of 50. However, father is now . Patient presents to ER with complaint of Intermittent retrosternal chest pain which started couple of days ago. Pain is non radiating and there are no known aggravating and relieving factors. Patient still has a dull pain at the moment. Pain has not responded to NTG sublingual or drip. However, initial 2 troponins were 2.4 and 2.2 without ST-T changes. Patient denies any fever, chills, nausea, vomiting, upper respiratory symptoms including cough. Pain is non reproducible and there is no report of recent chest trauma. We will obtain the third cardiac enzyme and keep npo. Will request cardiology consult. Continue current line of treatment.      Attending Physical Exam:  Constitutional: No acute distress, awake, alert  Eyes: PERRLA, sclerae anicteric, no conjunctival injection  HENT: NCAT, mucous membranes moist  Neck: Supple, no thyromegaly, no lymphadenopathy, trachea midline  Respiratory: Clear to auscultation bilaterally, nonlabored respirations   Cardiovascular: RRR, no murmurs, rubs, or gallops, palpable pedal pulses bilaterally  Gastrointestinal: Positive bowel sounds, soft, nontender, nondistended  Musculoskeletal: No bilateral ankle edema, no clubbing or cyanosis to extremities  Psychiatric: Appropriate affect, cooperative  Neurologic: Oriented x 3, strength symmetric in all extremities, Cranial Nerves grossly intact, speech clear  Skin: No rashes      Brief Assessment/Plan :  See above for further detailed assessment and plan developed with APC which I have reviewed and/or edited.      Electronically signed by Farzad Marmolejo MD, 19, 10:01 PM.           Electronically signed by Farzad Marmolejo MD at 2019 10:22 PM          Emergency Department Notes      Briseyda Carranza PA-C at 2019  6:31 PM     Attestation signed by Evan Powell MD at 2019  9:10 PM          For this patient encounter,  I reviewed the NP or PA documentation, treatment plan, and medical decision making. Evan Powell MD 1/19/2019 9:10 PM                  Subjective   Patient presents complaining of dull central last chest pain ongoing for the past 2 days.  He reports a history of anxiety.  He denies any exacerbating or alleviating factors.  He denies any shortness of breath or cough.  He denies any nausea or vomiting.  He was seen here by urgent care after an abnormal EKG.  The patient's father has a history of cardiomyopathy.  The patient had been evaluated by cardiologist and told he may have early cardiomyopathy however he is followed now by Dr. Miner who does not feel he has any abnormalities.        Chest Pain   Pain location:  Substernal area  Pain quality: dull    Pain radiates to:  Does not radiate  Pain severity:  Moderate  Onset quality:  Gradual  Timing:  Constant  Progression:  Unchanged  Chronicity:  New  Context: not breathing and not lifting    Relieved by:  Nothing  Worsened by:  Nothing  Ineffective treatments:  None tried  Associated symptoms: no abdominal pain, no cough, no fever, no nausea, no shortness of breath and no vomiting        Review of Systems   Constitutional: Negative for chills and fever.   Respiratory: Negative for cough and shortness of breath.    Cardiovascular: Positive for chest pain.   Gastrointestinal: Negative for abdominal pain, nausea and vomiting.   Psychiatric/Behavioral: The patient is nervous/anxious.    All other systems reviewed and are negative.      Past Medical History:   Diagnosis Date   • Allergic rhinitis Lifelong   • Anxiety 2015    Recurrent anxiety and distress at home   • Anxiety    • AV yudi re-entry tachycardia (CMS/HCC) 2011    RFA - successful   • Closed left ankle fracture 1988   • Hyperlipidemia 2016    Cholesterol 134   • Prehypertension 2016    Average blood pressure 125/85       No Known Allergies    Past Surgical History:   Procedure Laterality Date   •  RADIOFREQUENCY ABLATION      AV nod tach       Family History   Problem Relation Age of Onset   • No Known Problems Mother    • Hypertrophic cardiomyopathy Father          age 72   • Hypertension Father        Social History     Socioeconomic History   • Marital status:      Spouse name: Not on file   • Number of children: Not on file   • Years of education: Not on file   • Highest education level: Not on file   Tobacco Use   • Smoking status: Never Smoker   • Smokeless tobacco: Never Used   Substance and Sexual Activity   • Alcohol use: Yes     Alcohol/week: 1.2 oz     Types: 2 Glasses of wine per week   • Drug use: No   • Sexual activity: Yes     Partners: Female   Social History Narrative    Domestic life : Lives in private home with wife and 2 children        Jewish : Christian        Sleep hygiene : In bed 10 PM to 6 AM for 8 hours of sleep        Caffeine use : 1 or 2 cups of coffee daily        Exercise habits : Jogs 3 miles 4 days weekly.  200 pushups 3 days weekly.          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 He appears well-developed and well-nourished.   HENT:   Head: Normocephalic and atraumatic.   Cardiovascular: Normal rate and regular rhythm. Exam reveals no gallop and no friction rub.   No murmur heard.  Pulmonary/Chest: Effort normal and breath sounds normal. No stridor. No tachypnea. No respiratory distress. He has no wheezes. He has no rales.   Abdominal: Soft. He exhibits no distension. There is no tenderness. There is no guarding.   Musculoskeletal: Normal range of motion.        Right lower leg: Normal.        Left lower leg: Normal.   Neurological: He is alert and  oriented to person, place, and time.   Skin: Skin is warm and dry.   Psychiatric: He has a normal mood and affect. His behavior is normal.       Procedures          ED Course  ED Course as of Jan 19 1903   Sat Jan 19, 2019   1659 Patient is seen and evaluated by me.  He continues have discomfort.  He has anterior chest pain without radiation.  ECG shows J-point elevation in a single lead without contiguous lead elevation.  He does not meet criteria for an ST elevation MI.I discussed with Dr. Scherer who is requested hospitalist admission and will consult for cardiology.  He is in agreement with cardiac medications as ordered.I discussed the plan with the patient and spouse who agree with plan of care.  [HH]   1835 aPTT: (!) 36.2 [WT]   1835 Protime: 13.5 [WT]   1835 Troponin I: (!!) 2.42 [WT]   1835 Hemoglobin: 16.1 [WT]   1835 Hematocrit: 44.9 [WT]   1835 BNP: 6.0 [WT]   1835 Lipase: 36 [WT]   1836 CXR: No acute cardiopulmonary process.     [WT]   1837 EKG 1602, rate 69 NSR, mild elevation of ST segment v3.   [WT]   1838 EKG 1833 shows NSR 77  [WT]   1902 EKG 1833 NSR 77  [WT]      ED Course User Index  [HH] Evan Powell MD  [WT] Briseyda Carranza, STACY                  MDM  Number of Diagnoses or Management Options  Elevated troponin:   NSTEMI (non-ST elevated myocardial infarction) (CMS/[facility]):   Diagnosis management comments: Patient presents complaining of dull chest pain.  Differential diagnosis includes ACS, pulmonary embolism, musculoskeletal chest pain, anxiety.  Plan to obtain CBC, CMP, troponin, chest x-ray, EKG.  PERC negative, no PE workup indicated. Patient had mild ST elevation in V3 on EKG.  His troponin was elevated at 2.4.  He was given heparin bolus followed by heparin infusion, nitroglycerin drip, aspirin, Plavix as well as Ativan for anxiety.  Dr. Powell did speak with cardiology and the patient will be admitted to the hospitalist.         Final diagnoses:   Elevated troponin   NSTEMI (non-ST  elevated myocardial infarction) (CMS/HCC)            Briseyda Carranza PA-C  19 1903      Electronically signed by Evan Powell MD at 2019  9:10 PM       Leon Carnes   Echocardiogram   Order# [id number]   Reading physician: Lance Scherer MD Ordering physician: Briseyda Carranza PA-C Study date: 19   Patient Information     Patient Name  Leon Carnes MRN  [id number] Sex  Male  (Age)  1980 (39 y.o.)   Sedation Narrator Report     Sedation Narrator Report   Interpretation Summary     · Left ventricular systolic function is hyperdynamic (EF > 70).  · Adjacent to the left ventricle.  · Mild dilation of the sinuses of Valsalva is present.        Leon Carnes   Cardiac Catheterization/Vascular Study   Order# [id number]   Reading physician: Filiberto Miner IV, MD Ordering physician: Filiberto Miner IV, MD Study date: 19    Procedures     Left Heart Cath      Patient Information     Patient Name  Leon Carnes MRN  [id number] Sex  Male  (Age)  1980 (39 y.o.)   Race Ethnicity Encounter Category   White or  Not  or  Emergency   Patient Hx Of Height, Weight, and Vitals     Height Weight BSA (Calculated - sq m) BMI (kg/m2) Pulse BP       70 168/107   Physicians     Panel Physicians Referring Physician Case Authorizing Physician   Filiberto Miner IV, MD (Primary) Arvind Jaime MD Richmond, Henry Charles T IV, MD   Indications     NSTEMI (non-ST elevated myocardial infarction) (CMS/HCC) [I21.4 (ICD-10-CM)]   Pre-procedure Diagnosis     NSTEMI       Conclusion     Recommendations:  · Treat for myopericarditis with NSAIDs     ----------------------------------------------------------------------------------------------------------------------     Clinical History: 39-year-old gentleman who presented to [facility] with chest pain and ruled in for myocardial infarction with cardiac enzymes.  CT chest  was negative for pulmonary embolism or aortic dissection. Patient reported an episode of diarrheal illness approximately 5 days prior to presentation     Access:  6 Bulgarian left radial     Procedure narrative:  The left wrist was prepped and draped in sterile fashion.  A radial sheath was placed.  Radial cocktail was administered.  Selective right and left coronary angiography performed with diagnostic catheters.  Left heart catheterization and left ventriculography were performed using a pigtail catheter.  At the conclusion procedure, the sheath was removed and hemostasis achieved with a manual compression band.     Angiographic Findings:  · Coronary circulation is right dominant  · Left main:  Normal  · LAD: Normal  · LCX: Normal  · RCA: Normal     Left ventricle: LVEF 60%     Mitral regurgitation: No significant     Hemodynamic Findings:  Ao pressure: 135/89 mmHg  LVEDP: 16 mmHg     No aortic valve gradient to suggest aortic stenosis.     Complications: None apparent          Physician Progress Notes (last 7 days) (Notes from 1/15/2019 10:25 AM through 2019 10:25 AM)      Arvind Jaime MD at 2019  3:23 PM              Psychiatric Medicine Services  PROGRESS NOTE    Patient Name: Leon Carnes  : 1980  MRN: [id number]    Date of Admission: 2019  Length of Stay: 1  Primary Care Physician: South Hollingsworth DO    Subjective   Subjective     CC:  Elevated troponin    HPI:  Wife, Ana Luisa, in room today.  She is an advanced Practice Nurse Practitioner.  He was working at home and had chest pains.  He reportedly works out without chest pains.  Never had cardiac cath or heart attack.  Does not smoke.  Seen by Dr. Cooper today.    Review of Systems    Gen- No fevers, chills  CV- No chest pain, palpitations  Resp- No cough, dyspnea  GI- No N/V/D, abd pain    Otherwise ROS is negative except as mentioned in the HPI.    Objective   Objective     Vital Signs:   Temp:  [97.4 °F  (36.3 °C)-97.9 °F (36.6 °C)] 97.9 °F (36.6 °C)  Heart Rate:  [47-93] 81  Resp:  [16-18] 16  BP: (110-184)/() 152/102     Physical Exam:    Constitutional: No acute distress, awake, alert  HENT: NCAT, mucous membranes moist  Respiratory: Clear to auscultation bilaterally, respiratory effort normal   Cardiovascular: RRR, no murmurs, rubs, or gallops, palpable pedal pulses bilaterally  Gastrointestinal: Positive bowel sounds, soft, nontender, nondistended  Musculoskeletal: No bilateral ankle edema  Psychiatric: Appropriate affect, cooperative  Neurologic: Oriented x 3, strength symmetric in all extremities, Cranial Nerves grossly intact to confrontation, speech clear  Skin: No rashes    Results Reviewed:  I have personally reviewed current lab, radiology, and data and agree.    Results from last 7 days   Lab Units 01/20/19  0446 01/19/19  1620   WBC 10*3/mm3 8.32 7.89   HEMOGLOBIN g/dL 13.8 16.1   HEMATOCRIT % 40.3 44.9   PLATELETS 10*3/mm3 161 188   INR  1.12 1.09     Results from last 7 days   Lab Units 01/20/19  0446 01/19/19  2231 01/19/19  1837  01/19/19  1620   SODIUM mmol/L 139  --   --   --  140   POTASSIUM mmol/L 4.8  --   --   --  3.6   CHLORIDE mmol/L 109  --   --   --  105   CO2 mmol/L 27.0  --   --   --  28.0   BUN mg/dL 12  --   --   --  13   CREATININE mg/dL 1.01  --   --   --  1.17   GLUCOSE mg/dL 97  --   --   --  83   CALCIUM mg/dL 8.8  --   --   --  9.7   ALT (SGPT) U/L  --   --   --   --  26   AST (SGOT) U/L  --   --   --   --  41*   TROPONIN I ng/mL 2.262* 3.145* 2.24*   < >  --     < > = values in this interval not displayed.     Estimated Creatinine Clearance: 126.6 mL/min (by C-G formula based on SCr of 1.01 mg/dL).    BNP   Date Value Ref Range Status   01/19/2019 6.0 0.0 - 100.0 pg/mL Final     Comment:     Results may be falsely decreased if patient taking Biotin.       Microbiology Results Abnormal     None          Imaging Results (last 24 hours)     Procedure Component Value Units  Date/Time    CT Angiogram Chest With Contrast [[id number]] Collected:  01/19/19 1920     Updated:  01/19/19 1957    Narrative:       EXAM:    CT Angiography Chest With Contrast     EXAM DATE/TIME:    1/19/2019 7:20 PM     CLINICAL HISTORY:    39 years old, male; Non-st elevation (nstemi) myocardial infarction; Abnormal   levels of other serum enzymes; Pain; Chest pain; Prior surgery; Surgery type:   Ablation; Additional info: Chest pain, acute, pe suspected, low pretest prob     TECHNIQUE:    Axial computed tomographic angiography images of the chest with intravenous   contrast using CT angiography protocol. Coronal reformatted images were   subsequently obtained and reviewed.    All CT scans at this facility use at least one of these dose optimization   techniques: automated exposure control; mA and/or kV adjustment per patient   size (includes targeted exams where dose is matched to clinical indication); or   iterative reconstruction.    MIP reconstructed images were created and reviewed.     CONTRAST:    80 ml of iso 370 administered intravenously.     COMPARISON:    CR XR CHEST 1 VW 1/19/2019 4:32 PM     FINDINGS:    Pulmonary arteries:  No acute pulmonary embolus.    Aorta: Normal. No aortic aneurysm. No aortic dissection.    Lungs: Normal. No consolidation. No masses.    Pleural space: Normal. No pneumothorax. No pleural effusion.    Heart:  No cardiomegaly.    Lymph nodes:  Right hilar calcified lymph nodes.    Bones/joints: Unremarkable. No acute fracture.    Soft tissues: Unremarkable.       Impression:       No acute pulmonary embolus.    THIS DOCUMENT HAS BEEN ELECTRONICALLY SIGNED BY EMILIANO XIE MD    XR Chest 1 View [[id number]] Collected:  01/19/19 1834     Updated:  01/19/19 1834    Narrative:          EXAMINATION: XR CHEST 1 VW - 1/19/2019     INDICATION: Chest pain.     COMPARISON: NONE     FINDINGS: Cardiac size within normal limits. Prior granulomatous  involvement including calcified right hilar  lymph nodes without focal  consolidation or opacification. No pneumothorax or significant effusion.  Degenerative changes of the spine.           Impression:       No acute cardiopulmonary process.     DICTATED:   1/19/2019  EDITED/ls :   1/19/2019                 Results for orders placed during the hospital encounter of 01/19/19   Adult Transthoracic Echo Complete W/ Cont if Necessary Per Protocol    Narrative · Left ventricular systolic function is hyperdynamic (EF > 70).  · Adjacent to the left ventricle.  · Mild dilation of the sinuses of Valsalva is present.          I have reviewed the medications:    Current Facility-Administered Medications:   •  acetaminophen (TYLENOL) tablet 650 mg, 650 mg, Oral, Q4H PRN, Orquidea Cummings APRN  •  atorvastatin (LIPITOR) tablet 80 mg, 80 mg, Oral, Nightly, Orquidea Cummings APRN, 80 mg at 01/19/19 2149  •  buPROPion XL (WELLBUTRIN XL) 24 hr tablet 300 mg, 300 mg, Oral, Daily, Orquidea Cummings APRN, 300 mg at 01/20/19 0858  •  heparin 90432 units/250 mL (100 units/mL) in 0.45 % NaCl infusion, 13 Units/kg/hr, Intravenous, Titrated, Adonis Murphy [facility], Last Rate: 11.79 mL/hr at 01/20/19 0039, 13 Units/kg/hr at 01/20/19 0039  •  LORazepam (ATIVAN) tablet 0.5 mg, 0.5 mg, Oral, Q12H PRN, Farzad Marmolejo MD, 0.5 mg at 01/19/19 2159  •  metoprolol tartrate (LOPRESSOR) tablet 25 mg, 25 mg, Oral, Q12H, Orquidea Cummings APRN, 25 mg at 01/19/19 2149  •  nitroglycerin 50 mg/250 mL (0.2 mg/mL) infusion, 5-200 mcg/min, Intravenous, Titrated, Briseyda Carranza PA-C, Last Rate: 3 mL/hr at 01/19/19 1757, 10 mcg/min at 01/19/19 1757  •  ondansetron (ZOFRAN) tablet 4 mg, 4 mg, Oral, Q6H PRN **OR** ondansetron (ZOFRAN) injection 4 mg, 4 mg, Intravenous, Q6H PRN, Orquidea Cummings APRN  •  Pharmacy Consult - MTM, , Does not apply, Daily, Re Avila, RP  •  Pharmacy to Dose Heparin, , Does not apply, Continuous PRN, Michael Armstrong, RP  •  potassium chloride (MICRO-K) CR  capsule 40 mEq, 40 mEq, Oral, PRN, 40 mEq at 01/19/19 2159 **OR** potassium chloride (KLOR-CON) packet 40 mEq, 40 mEq, Oral, PRN **OR** potassium chloride 10 mEq in 100 mL IVPB, 10 mEq, Intravenous, Q1H PRN, Farzad Marmolejo MD  •  sodium chloride 0.9 % flush 10 mL, 10 mL, Intravenous, PRN, Evan Powell MD  •  sodium chloride 0.9 % flush 3 mL, 3 mL, Intravenous, Q12H, Orquidea Cummings APRN  •  sodium chloride 0.9 % flush 3-10 mL, 3-10 mL, Intravenous, PRN, Orquidea Cummings APRN  •  sodium chloride 0.9 % infusion, 125 mL/hr, Intravenous, Continuous, Evan Powell MD, Last Rate: 125 mL/hr at 01/20/19 0542, 125 mL/hr at 01/20/19 0542      Assessment/Plan   Assessment / Plan     Active Hospital Problems    Diagnosis Date Noted   • **NSTEMI (non-ST elevated myocardial infarction) (CMS/HCC) [I21.4] 01/19/2019   • Elevated troponin [R74.8] 01/19/2019   • HLD (hyperlipidemia) [E78.5] 03/08/2018   • Prehypertension [R03.0] 03/22/2017   • Anxiety [F41.9] 12/06/2016   • History of SVT  [I47.1] 12/06/2016     · 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 male here with NSTEMI    NSTEMI  - unclear etiology  - seen by Cardiology today  - continue drips  History of SVT  - s/p ablation by Ted  Family History of Heart Disease NOS  Stress  - self reported a lot of life stress    DVT Prophylaxis:  Heparin infusion    Disposition: I expect the patient to be discharged TBD    CODE STATUS:   Code Status and Medical Interventions:   Ordered at: 01/19/19 2027     Code Status:    CPR     Medical Interventions (Level of Support Prior to Arrest):    Full         Electronically signed by Arvind Jaime MD, 01/20/19, 3:23 PM.      Electronically signed by Arvind Jaime MD at 1/20/2019  3:28 PM          Consult Notes (last 7 days) (Notes from 01/15/19 through 01/22/19)      Christopher Cooper MD at  1/20/2019  9:41 AM      Consult Orders    1. Inpatient Cardiology Consult [[id number]] ordered by Orquidea Cummings APRN at 01/19/19 2027                  Leon Carnes  [id number]  1980   LOS: 1 day   Patient Care Team:  PHYSICIAN: South Hollingsworth DO  ELECTROPHYSIOLOGIST: Mahendra Jean MD, [facility]  CARDIOLOGIST: Du Miner MD, [facility]    This is a 39-year-old  white male from [place], [place], works for [employer] as an  in [employer]    Chief Complaint:  NSTEMI    Problem List:  3. NSTEMI  A. Echocardiogram 2008: Mild LVH, trace MR, EF greater than 60%  B. Cardiac MRI approximately 2011: Interventricular septal thickness 11 mm, within normal limits  C. Echocardiogram 2/17/14: LVEF normal with normal diastolic parameters, normal functioning valves, no evidence of hypertrophic cardiomyopathy  D. CCS class II chest discomfort/NYHA class I shortness of breath with elevated troponins and transient abnormal EKG changes  4. Hyperlipidemia; ASCVD 10 year risk is 1.2%, 0.6% if treated  5. Hypertension  6. Remote AVNRT/RFA in May 2001  7. Anxiety  8. Family history of hypertrophic cardiomyopathy  9. Surgical history: AVNRT/RFA     No Known Allergies  Medications Prior to Admission   Medication Sig Dispense Refill Last Dose   • buPROPion XL (WELLBUTRIN XL) 300 MG 24 hr tablet TAKE ONE TABLET BY MOUTH DAILY 30 tablet 1    • fluticasone (FLONASE) 50 MCG/ACT nasal spray 1 spray into each nostril Daily As Needed.   Taking     Scheduled Meds:  atorvastatin 80 mg Oral Nightly   buPROPion  mg Oral Daily   metoprolol tartrate 25 mg Oral Q12H   pharmacy consult - MTM  Does not apply Daily   sodium chloride 3 mL Intravenous Q12H     Continuous Infusions:  heparin (porcine) 13 Units/kg/hr Last Rate: 13 Units/kg/hr (01/20/19 0039)   nitroglycerin 5-200 mcg/min Last Rate: 10 mcg/min (01/19/19 1757)   Pharmacy to Dose Heparin     sodium chloride 125 mL/hr Last Rate: 125 mL/hr (01/20/19 9881)  "         History of Present Illness:   This is a 39-year-old white male who presented to [facility] ED 1/19/19 with chest pressure that was intermittent throughout the day.  Pain woke him up in the morning and he took an Advil which helped temporarily but then the pain returned.  [facility] where an ECG was performed and he was told that it was \"abnormal\" and instructed to come to the [facility] ED for further evaluation.  He denied any shortness of breath, nausea, vomiting, diaphoresis, fever, chills, edema, radiation of chest pain, presyncope, syncope, or palpitations.  He has had costochondritis in the past and reports that at first the pain felt similar to that.  He had been working very hard in his basement the past few days and was wondering if he had just overworked himself and that was why his chest was sore.  He was not having any chest pain while he was doing his strenuous work.  Remotely he had an AVNRT ablation in 2001.  His troponins are downward trending.  He is active and jogs 3 miles 4 days a week and does 200 pushups 3 times a week.  ECG on admission demonstrated sinus rhythm with incomplete RBBB.  He has never been a smoker and states that he suffers from severe anxiety with his job and mourning his father who passed away 2 years ago.  He is on wellbutrin but his physician is thinking about switching him to a different antidepressant. He is having difficulties with his mom because she has a new boyfriend.  He denies illicit drug use.  No recent febrile illness, chills, pleurisy, hemoptysis, or /GI difficulty.  Currently asymptomatic at rest.    Cardiac risk factors: dyslipidemia and male gender.    Social History     Socioeconomic History   • Marital status:      Spouse name: Not on file   • Number of children: 2   • Years of education: Not on file   • Highest education level: Not on file   Social Needs   • Financial resource strain: Not on file   • Food insecurity - worry: " "Not on file   • Food insecurity - inability: Not on file   • Transportation needs - medical: Not on file   • Transportation needs - non-medical: Not on file   Occupational History   • Not on file   Tobacco Use   • Smoking status: Never Smoker   • Smokeless tobacco: Never Used   Substance and Sexual Activity   • Alcohol use: Yes     Alcohol/week: 1.2 oz     Types: 2 Glasses of wine per week   • Drug use: No   • Sexual activity: Yes     Partners: Female   Other Topics Concern   • Not on file   Social History Narrative    Domestic life : Lives in private home with wife and 2 children        Anglican : Baptist        Sleep hygiene : In bed 10 PM to 6 AM for 8 hours of sleep        Caffeine use : 1 or 2 cups of coffee daily        Exercise habits : Jogs 3 miles 4 days weekly.  200 pushups 3 days weekly.          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      Objective:      Objective Physical Exam  /100   Pulse 58   Temp 97.7 °F (36.5 °C) (Oral)   Resp 16   Ht 188 cm (74\")   Wt 89.4 kg (197 lb 3.2 oz)   SpO2 99%   BMI 25.32 kg/m²        19   Weight: 90.7 kg (200 lb) 89.4 kg (197 lb 3.2 oz)     Body mass index is 25.32 kg/m².    Intake/Output Summary (Last 24 hours) at 2019 0941  Last data filed at 2019 0542  Gross per 24 hour   Intake 950 ml   Output --   Net 950 ml       General Appearance:  Alert, cooperative, no " distress, appears stated age   Head:  Normocephalic, without obvious abnormality, atraumatic   Neck: Supple, symmetrical, trachea midline, no adenopathy, thyroid: not enlarged, symmetric, no tenderness/mass/nodules, no carotid bruit or JVD   Lungs:   Clear to auscultation bilaterally, respirations unlabored   Heart:  Regular rate and rhythm, S1, S2 normal, no murmur, rub or gallop   Abdomen:   Soft, non-tender, no masses, no organomegaly, bowel sounds audible x4   Extremities: No edema, normal range of motion   Pulses: 2+ and symmetric   Skin: Skin color, texture, turgor normal, no rashes or lesions   Neurologic: Normal       Cardiographics:    · EK19:  Sinus rhythm with sinus arrhythmia with 1st degree AV block  Otherwise normal ECG  When compared with ECG of 2019 18:33,  No significant change was found; REVIEWED.    · Echocardiogram 19:  · Left ventricular systolic function is hyperdynamic (EF > 70).  · Adjacent to the left ventricle.  · Mild dilation of the sinuses of Valsalva is present.    Imaging:    · Chest x-ray 19: No acute cardiopulmonary process; REVIEWED.    · CTA chest 19: No acute PE    Lab Review:     Results from last 7 days   Lab Units 19  0446 19  1620   SODIUM mmol/L 139 140   POTASSIUM mmol/L 4.8 3.6   CHLORIDE mmol/L 109 105   CO2 mmol/L 27.0 28.0   BUN mg/dL 12 13   CREATININE mg/dL 1.01 1.17   GLUCOSE mg/dL 97 83   CALCIUM mg/dL 8.8 9.7     Results from last 7 days   Lab Units 19  0446 19  1620   WBC 10*3/mm3 8.32 7.89   HEMOGLOBIN g/dL 13.8 16.1   HEMATOCRIT % 40.3 44.9   PLATELETS 10*3/mm3 161 188     Results from last 7 days   Lab Units 19  0446   CHOLESTEROL mg/dL 185   TRIGLYCERIDES mg/dL 63   HDL CHOL mg/dL 53   LDL CHOL mg/dL 133*     Results from last 7 days   Lab Units 19  0446   HEMOGLOBIN A1C % 5.10     Results from last 7 days   Lab Units 19  0446 19  2231   TROPONIN I ng/mL 2.262* 3.145*    DRIPS:  · Heparin gtt 13 units/kg/hr  · Nitroglycerin gtt 10mcg/min      Assessment:      Patient with possible non-STEMI and downward trending troponin levels, negative CTA chest,  chest x-ray, and ECG demonstrating sinus rhythm with first-degree AV block and no ST-T wave changes. Echocardiogram acceptable.  I am perplexed by his presentation; differential includes acute coronary syndrome versus myopericarditis versus vasospastic coronary artery disease.  We will defer to Dr. Miner regarding a potential echocardiographic GXT versus LHC in the morning.  Concur with current medications and close observation and monitoring.  Concur with high dose atorvastatin at this point.     Plan:   1. Trend troponins  2. Hold metoprolol in am; defer to Dr. Miner on a potential echocardiographic GXT stress test versus LHC in morning  3. Cardiac diet  4. NPO after midnight except for medications  5. Continue heparin and nitro gtt's   6. ECG in morning    Discussed with patient, wife, and 2 additional family members in room.    Scribed for Christopher Cooper MD by OVIDIO Dill. 2019  10:20 AM     IChristopher MD, [facility], personally performed the services described in this documentation as scribed by the above named individual in my presence, and it is both accurate and complete.       Electronically signed by Christopher Cooper MD at 2019 10:55 AM          Discharge Summary      Micki Garcia, OVIDIO at 2019  9:05 AM              [facility] Medicine Services  DISCHARGE SUMMARY    Patient Name: Leon Carnes  : 1980  MRN: [id number]    Date of Admission: 2019  Date of Discharge:  2019  Primary Care Physician: South Hollingsworth DO    Consults     Date and Time Order Name Status Description    2019 0030 Inpatient Cardiology Consult Completed           Hospital Course     Presenting Problem:   Elevated troponin [R74.8]    Active Hospital Problems     Diagnosis Date Noted   • **Acute idiopathic myocarditis [I40.1] 2019   • Prehypertension [R03.0] 2017   • Anxiety [F41.9] 2016      Resolved Hospital Problems    Diagnosis Date Noted Date Resolved   • Elevated troponin [R74.8] 2019          Hospital Course:  Leon Carnes is a 39 y.o. male with no known hx of CAD but significant family hx of CAD.  Father had multiple cardiac issues including VHD, Afib and CM with pacer and all started at the age of 50.  Now .  Patient presents to ER with complaint of Intermittent retrosternal chest pain which started couple of days prior. Pain is non radiating and there are no known aggravating and relieving factors. Patient still had dull pain on initial admit. Initial 2 troponins were 2.4 and 2.2 without ST-T changes. Patient denied any fever, chills, nausea, vomiting, upper respiratory symptoms including cough. Pain was non reproducible and there were no reports of recent chest trauma. He was admitted to hospital medicine service.  Cardiology was consulted. Pts troponin peaked at 3.145 at 2231 pm on .  He was continued on nitro and heparin gtts.  He had been cp free since sometime  morning.      This am pt went for heart cath per Dr Miner.  [place] to be a negative cath. Echo okay.  Dr Miner follows pt outpt.  He felt this to be a likely viral induced myocarditis.  Pt was started on ibuprofen 600 mg po tid until f/u with cards.  He is seen again this afternoon at 1315 pm after just returning from cath lab. Family at .  Pt is awake and alert.  In good spirits.  CP free.  He is currently hemodynamically stable and afebrile.  He has been cleared for dc home per cards after pressure band removed this afternoon.  He will dc on his current home meds with the addition of ibuprofen 600 mg tid until cards f/u.  F/u with PCP 1 week.  Return to ER for any worsening.  Wife at  agrees to plan.        Discharge Follow Up  Recommendations for labs/diagnostics:  S/p heart cath today per Dr Miner.  Okay to dc home per cards on ibuprofen 600 mg tid.  F/u Dr Miner in 4 weeks.  F/u PCP 1 week.     Day of Discharge     HPI:   Pt is seen initially at 0905 am today resting up in chair in NAD.  Wife and mother at bs.  He states he feels okay.  Denies any cp, soa, dizziness, n/v, or abd pain.  NPO since MN for planned heart cath today by Dr Miner.  States no cp since yesterday am.  Ambulating independent.  Feels nervous but no other issues expressed.     Addendum 1315 pm  Pt just back from cath lab.  Negative heart cath.  Ok to dc home from cards standpoint on ibuprofen 600mg tid until f/u appt with cards.  Cards feels this was cp related to a viral induced myocarditis.  I spoke with pt and wife on his return to his room.  He feels good.  Both feel ready for dc home.  Happy it was a negative cath.      Review of Systems  Gen- No fevers, chills  CV- No chest pain, palpitations  Resp- No cough, dyspnea  GI- No N/V/D, abd pain      Otherwise ROS is negative except as mentioned in the HPI.    Vital Signs:   Temp:  [97.5 °F (36.4 °C)-98 °F (36.7 °C)] 97.5 °F (36.4 °C)  Heart Rate:  [51-80] 80  Resp:  [16] 16  BP: (100-168)/() 137/85     Physical Exam:  Constitutional: No acute distress, awake, alert.  Sitting up in chair with wife and mother at bedside.   HENT: NCAT, mucous membranes moist  Respiratory: Clear to auscultation bilaterally A/P, respiratory effort normal on RA  Cardiovascular: RRR, no murmurs, rubs, or gallops, palpable pedal pulses bilaterally  Gastrointestinal: Positive bowel sounds, soft, nontender, nondistended  Musculoskeletal: No bilateral ankle edema.  BUCHANAN spontaneously   Psychiatric: Appropriate affect, cooperative and very pleasant.  Mildly, appropriately anxious  Neurologic: Oriented x 3, strength symmetric in all extremities, Cranial Nerves grossly intact to confrontation, speech clear and appropriate.  Follows  commands   Skin: No rashes      Pertinent  and/or Most Recent Results     Results from last 7 days   Lab Units 01/21/19  0555 01/20/19  0446 01/19/19  1620   WBC 10*3/mm3  --  8.32 7.89   HEMOGLOBIN g/dL  --  13.8 16.1   HEMATOCRIT %  --  40.3 44.9   PLATELETS 10*3/mm3  --  161 188   SODIUM mmol/L 140 139 140   POTASSIUM mmol/L 4.2 4.8 3.6   CHLORIDE mmol/L 109 109 105   CO2 mmol/L 28.0 27.0 28.0   BUN mg/dL 12 12 13   CREATININE mg/dL 0.87 1.01 1.17   GLUCOSE mg/dL 89 97 83   CALCIUM mg/dL 8.7 8.8 9.7     Results from last 7 days   Lab Units 01/20/19  0446 01/19/19  1620   BILIRUBIN mg/dL  --  0.8   ALK PHOS U/L  --  27   ALT (SGPT) U/L  --  26   AST (SGOT) U/L  --  41*   PROTIME Seconds 13.9 13.5   INR  1.12 1.09   APTT seconds  --  36.2*     Results from last 7 days   Lab Units 01/20/19  0446 01/19/19  1620   CHOLESTEROL mg/dL 185  --    TRIGLYCERIDES mg/dL 63 118   HDL CHOL mg/dL 53  --      Results from last 7 days   Lab Units 01/21/19  0555 01/20/19  0446 01/19/19  2231 01/19/19  1837  01/19/19  1620   TSH mIU/mL  --  1.326  --   --   --   --    HEMOGLOBIN A1C %  --  5.10  --   --   --   --    BNP pg/mL  --   --   --   --   --  6.0   TROPONIN I ng/mL 1.659* 2.262* 3.145* 2.24*   < >  --     < > = values in this interval not displayed.       Brief Urine Lab Results  (Last result in the past 365 days)      Color   Clarity   Blood   Leuk Est   Nitrite   Protein   CREAT   Urine HCG        03/08/18 1635 Yellow Clear Negative Negative Negative Negative               Microbiology Results Abnormal     None          Imaging Results (all)     Procedure Component Value Units Date/Time    XR Chest 1 View [[id number]] Collected:  01/19/19 1834     Updated:  01/21/19 0848    Narrative:          EXAMINATION: XR CHEST 1 VW - 1/19/2019     INDICATION: Chest pain.     COMPARISON: NONE     FINDINGS: Cardiac size within normal limits. Prior granulomatous  involvement including calcified right hilar lymph nodes without  focal  consolidation or opacification. No pneumothorax or significant effusion.  Degenerative changes of the spine.           Impression:       No acute cardiopulmonary process.     DICTATED:   1/19/2019  EDITED/ls :   1/19/2019      This report was finalized on 1/21/2019 8:45 AM by Dr. Jermaine Nye.       CT Angiogram Chest With Contrast [[id number]] Collected:  01/19/19 1920     Updated:  01/19/19 1957    Narrative:       EXAM:    CT Angiography Chest With Contrast     EXAM DATE/TIME:    1/19/2019 7:20 PM     CLINICAL HISTORY:    39 years old, male; Non-st elevation (nstemi) myocardial infarction; Abnormal   levels of other serum enzymes; Pain; Chest pain; Prior surgery; Surgery type:   Ablation; Additional info: Chest pain, acute, pe suspected, low pretest prob     TECHNIQUE:    Axial computed tomographic angiography images of the chest with intravenous   contrast using CT angiography protocol. Coronal reformatted images were   subsequently obtained and reviewed.    All CT scans at this facility use at least one of these dose optimization   techniques: automated exposure control; mA and/or kV adjustment per patient   size (includes targeted exams where dose is matched to clinical indication); or   iterative reconstruction.    MIP reconstructed images were created and reviewed.     CONTRAST:    80 ml of iso 370 administered intravenously.     COMPARISON:    CR XR CHEST 1 VW 1/19/2019 4:32 PM     FINDINGS:    Pulmonary arteries:  No acute pulmonary embolus.    Aorta: Normal. No aortic aneurysm. No aortic dissection.    Lungs: Normal. No consolidation. No masses.    Pleural space: Normal. No pneumothorax. No pleural effusion.    Heart:  No cardiomegaly.    Lymph nodes:  Right hilar calcified lymph nodes.    Bones/joints: Unremarkable. No acute fracture.    Soft tissues: Unremarkable.       Impression:       No acute pulmonary embolus.    THIS DOCUMENT HAS BEEN ELECTRONICALLY SIGNED BY EMILIANO XIE MD                     Results for orders placed during the hospital encounter of 01/19/19   Adult Transthoracic Echo Complete W/ Cont if Necessary Per Protocol    Narrative · Left ventricular systolic function is hyperdynamic (EF > 70).  · Adjacent to the left ventricle.  · Mild dilation of the sinuses of Valsalva is present.            Discharge Details        Discharge Medications      New Medications      Instructions Start Date   famotidine 20 MG tablet  Commonly known as:  PEPCID   20 mg, Oral, 2 Times Daily      ibuprofen 600 MG tablet  Commonly known as:  ADVIL,MOTRIN   600 mg, Oral, Every 8 Hours         Continue These Medications      Instructions Start Date   buPROPion  MG 24 hr tablet  Commonly known as:  WELLBUTRIN XL   TAKE ONE TABLET BY MOUTH DAILY      fluticasone 50 MCG/ACT nasal spray  Commonly known as:  FLONASE   1 spray, Nasal, Daily PRN             No Known Allergies      Discharge Disposition:  Home or Self Care    Discharge Diet:  Diet Order   Procedures   • Diet Regular         Discharge Activity:   Activity Instructions     Activity as Tolerated              CODE STATUS:    Code Status and Medical Interventions:   Ordered at: 01/19/19 2027     Code Status:    CPR     Medical Interventions (Level of Support Prior to Arrest):    Full         Future Appointments   Date Time Provider Department Center   1/29/2019  8:00 AM South Hollingsworth DO [facility] None   3/5/2019 10:30 AM Filiberto Miner IV, MD [facility] None   3/11/2019  8:15 AM South Hollingsworth DO [facility] None   4/30/2019  9:30 AM Filiberto Miner IV, MD [facility] None       Additional Instructions for the Follow-ups that You Need to Schedule     Discharge Follow-up with PCP   As directed       Currently Documented PCP:    South Hollingsworth DO    PCP Phone Number:    [phone]     Follow Up Details:  1 week         Discharge Follow-up with Specialty: Dr Miner per his recs in 4 weeks   As directed       Specialty:  Dr Miner per his recs in 4 weeks         Discharge Follow-up with Specialty: Du Miner; 1 Month   As directed      Specialty:  Du Miner    Follow Up:  1 Month    Follow Up Details:  Hospital FU for elevated troponin, myocarditis               Time Spent on Discharge:  45 minutes    Electronically signed by OVIDIO Ortega, 01/21/19, 1:39 PM.        Electronically signed by Micki Garcia APRN at 1/21/2019  4:20 PM       Discharge Order (From admission, onward)    Start     Ordered    01/21/19 1305  Discharge patient  Once     Expected Discharge Date:  01/21/19    Expected Discharge Time:  Afternoon    Discharge Disposition:  Home or Self Care    Physician of Record for Attribution - Please select from Treatment Team:  CRAIG MINER IV [[id number]]    Review needed by CMO to determine Physician of Record:  No       Question Answer Comment   Physician of Record for Attribution - Please select from Treatment Team CRAIG MINER IV    Review needed by CMO to determine Physician of Record No        01/21/19 5345

## 2019-01-28 ENCOUNTER — OFFICE VISIT (OUTPATIENT)
Dept: FAMILY MEDICINE CLINIC | Facility: CLINIC | Age: 39
End: 2019-01-28

## 2019-01-28 VITALS
HEIGHT: 74 IN | TEMPERATURE: 98.1 F | SYSTOLIC BLOOD PRESSURE: 150 MMHG | WEIGHT: 205.6 LBS | HEART RATE: 68 BPM | BODY MASS INDEX: 26.39 KG/M2 | OXYGEN SATURATION: 96 % | RESPIRATION RATE: 16 BRPM | DIASTOLIC BLOOD PRESSURE: 92 MMHG

## 2019-01-28 DIAGNOSIS — F41.9 ANXIETY: ICD-10-CM

## 2019-01-28 DIAGNOSIS — I40.1 ACUTE IDIOPATHIC MYOCARDITIS: Primary | ICD-10-CM

## 2019-01-28 PROCEDURE — 99496 TRANSJ CARE MGMT HIGH F2F 7D: CPT | Performed by: FAMILY MEDICINE

## 2019-01-28 RX ORDER — BUSPIRONE HYDROCHLORIDE 15 MG/1
15 TABLET ORAL 3 TIMES DAILY PRN
Qty: 90 TABLET | Refills: 2 | Status: SHIPPED | OUTPATIENT
Start: 2019-01-28 | End: 2019-03-05

## 2019-01-28 NOTE — PATIENT INSTRUCTIONS
1.  Use Buspar as needed for anxiety.    2.  You will be called about the referral to psychology.    3.  Follow up for yearly.

## 2019-01-28 NOTE — PROGRESS NOTES
"Transitional Care Follow Up Visit  Subjective     Leon Carnes is a 39 y.o. male who presents for a transitional care management visit.    Within 48 business hours after discharge our office contacted him via telephone to coordinate his care and needs.      I reviewed and discussed the details of that call along with the discharge summary, hospital problems, inpatient lab results, inpatient diagnostic studies, and consultation reports with Leon.    Patient Care Team:  South Hollingsworth DO as PCP - General (Family Medicine)     Current outpatient and discharge medications have been reconciled for the patient.  Reviewed by: South Hollingsworth DO    Date of TCM Phone Call 2019   [facility]   Date of Admission 2019   Date of Discharge 2019   Discharge Disposition Home or Self Care     Risk for Readmission (LACE) Score: 6 (2019  6:00 AM)      History of Present Illness   Course During Hospital Stay:   \"Leon Carnes is a 39 y.o. male with no known hx of CAD but significant family hx of CAD.  Father had multiple cardiac issues including VHD, Afib and CM with pacer and all started at the age of 50.  Now .  Patient presents to ER with complaint of Intermittent retrosternal chest pain which started couple of days prior. Pain is non radiating and there are no known aggravating and relieving factors. Patient still had dull pain on initial admit. Initial 2 troponins were 2.4 and 2.2 without ST-T changes. Patient denied any fever, chills, nausea, vomiting, upper respiratory symptoms including cough. Pain was non reproducible and there were no reports of recent chest trauma. He was admitted to hospital medicine service.  Cardiology was consulted. Pts troponin peaked at 3.145 at 2231 pm on .  He was continued on nitro and heparin gtts.  He had been cp free since sometime  morning.       This am pt went for heart cath per Dr Miner.  Felt to be a negative " "cath. Echo okay.  Dr Miner follows pt outpt.  He felt this to be a likely viral induced myocarditis.  Pt was started on ibuprofen 600 mg po tid until f/u with cards.  He is seen again this afternoon at 1315 pm after just returning from cath lab. Family at .  Pt is awake and alert.  In good spirits.  CP free.  He is currently hemodynamically stable and afebrile.  He has been cleared for dc home per cards after pressure band removed this afternoon.  He will dc on his current home meds with the addition of ibuprofen 600 mg tid until cards f/u.  F/u with PCP 1 week.  Return to ER for any worsening.  Wife at  agrees to plan.\"    Since his hospitalization he has not had any recurrence of the retrosternal chest pain.  He has been compliant with his ibuprofen 600 mg 3 times a day.  It has not produced any gastrointestinal side effects.  He has not noticed any GI bleeding. He discontinued the Wellbutrin, he does not feel that it was working very well anymore and was concerned about cardiac effects. He states the heart episode really underscored for him the need to talk to someone about his anxiety. He has TREVON with intermittent panic attacks, particularly relating to work and his relationship with his mother.     The following portions of the patient's history were reviewed and updated as appropriate: allergies, current medications, past family history, past medical history, past social history, past surgical history and problem list.    Review of Systems   Constitutional: Negative.    Respiratory: Negative.    Cardiovascular: Negative for chest pain, palpitations and leg swelling.   Gastrointestinal: Negative for abdominal pain, blood in stool and vomiting.       Objective   Blood pressure 150/92, pulse 68, temperature 98.1 °F (36.7 °C), temperature source Oral, resp. rate 16, height 188 cm (74\"), weight 93.3 kg (205 lb 9.6 oz), SpO2 96 %.  Nursing note reviewed  Physical Exam  Const: NAD, A&Ox4, Pleasant, " Cooperative  Eyes: EOMI, no conjunctivitis  ENT: No nasal discharge present, neck supple  Cardiac: Regular rate and rhythm, no cyanosis  Resp: Respiratory rate within normal limits, no increased work of breathing, no audible wheezing or retractions noted  GI: No distention or ascites  MSK: Motor and sensation grossly intact in bilateral upper extremities  Neurologic: CN II-XII grossly intact  Psych: Appropriate mood and behavior.  Skin: Pink, warm, dry  Assessment/Plan     Leon was seen today for heart problem, hospital follow up and medication problem.    Diagnoses and all orders for this visit:    Acute idiopathic myocarditis    Anxiety  -     Ambulatory Referral to Psychology  -     busPIRone (BUSPAR) 15 MG tablet; Take 1 tablet by mouth 3 (Three) Times a Day As Needed (anxiety).       #1 acute idiopathic myocarditis  Negative heart cath January 2019 per Dr. Miner.  Presumed to be a viral induced myocarditis, he was started on ibuprofen 600 mg 3 times daily. Negative sed rate, he is ok with one full week of NSAID.  - Follow up with Dr. Miner in March    #2 Severe anxiety  Patient failed Wellbutrin and now with his cardiac event and worsening symptoms would really like to talk with someone on an ongoing basis. He has seen Jayne Camarena in the past and liked her, but found out about the death of a family member in her office and states he just has trouble going back there.  - Ref to psychology made today  - He could also do with help with medication management, ref to psychiatry placed  - In the meantime, he would like to avoid controlled substances which I would agree with, he may do well with Buspar as needed    He will be going to [place] next week for the [employer] and work, and will return in March for his annual exam.    Patient Instructions   1.  Use Buspar as needed for anxiety.    2.  You will be called about the referral to psychology.    3.  Follow up for yearly.

## 2019-03-04 NOTE — PROGRESS NOTES
[facility] at [facility]  Outpatient Follow-up Visit    Leon Carnes  1980  PCP: South Hollingsworth DO      ID:  Leon Carnes is a 39 y.o. male from [place], [place].     Chief Complaint   Patient presents with   • History of SVT            The patient returns today for his hospital follow up of his recent hospitalization for myocarditis.  The patient states that he his chest pain symptoms resolved with nonsteroidal anti-inflammatory medications approximately 3 days after his discharge.  He presently is back to normal activities without chest discomfort.    No Known Allergies    Current Outpatient Medications:   •  FLUoxetine (PROzac) 20 MG capsule, 20 mg Daily., Disp: , Rfl: 0  •  fluticasone (FLONASE) 50 MCG/ACT nasal spray, 1 spray into each nostril Daily As Needed., Disp: , Rfl:     Past Medical History, Past Surgical History, Family history, Social History, and Medications were all reviewed with the patient today and updated as necessary.     Past Medical History:   Diagnosis Date   • Allergic rhinitis Lifelong   • Anxiety     Recurrent anxiety and distress at home   • Anxiety    • AV yudi re-entry tachycardia (CMS/HCC)     RFA - successful   • Closed left ankle fracture    • Costochondritis    • Hyperlipidemia     Cholesterol 134   • Prehypertension     Average blood pressure 125/85     Past Surgical History:   Procedure Laterality Date   • CARDIAC CATHETERIZATION N/A 2019    Procedure: Left Heart Cath;  Surgeon: Filiberto Miner IV, MD;  Location: [facility] INVASIVE LOCATION;  Service: Cardiovascular   • RADIOFREQUENCY ABLATION      AV nod tach     Family History   Problem Relation Age of Onset   • No Known Problems Mother    • Hypertrophic cardiomyopathy Father          age 72   • Hypertension Father    • Heart disease Maternal Grandfather      Social History     Tobacco Use   • Smoking status: Never Smoker   • Smokeless  "tobacco: Never Used   Substance Use Topics   • Alcohol use: Yes     Alcohol/week: 1.2 oz     Types: 2 Glasses of wine per week       Review of Systems   All other systems reviewed and are negative.              /72 (BP Location: Right arm, Patient Position: Sitting)   Pulse 50   Ht 188 cm (74\")   Wt 91.4 kg (201 lb 9.6 oz)   SpO2 99%   BMI 25.88 kg/m²        Wt Readings from Last 5 Encounters:   03/05/19 91.4 kg (201 lb 9.6 oz)   01/28/19 93.3 kg (205 lb 9.6 oz)   01/19/19 89.4 kg (197 lb 3.2 oz)   11/26/18 92.7 kg (204 lb 6.4 oz)   10/08/18 91.3 kg (201 lb 4.8 oz)       BP Readings from Last 5 Encounters:   03/05/19 134/72   01/28/19 150/92   01/21/19 137/85   11/26/18 134/88   10/08/18 130/80       Physical Exam   Constitutional: He is oriented to person, place, and time. He appears well-developed and well-nourished.   HENT:   Head: Normocephalic and atraumatic.   Eyes: Pupils are equal, round, and reactive to light. No scleral icterus.   Neck: No JVD present. Carotid bruit is not present. No thyromegaly present.   Cardiovascular: Normal rate, regular rhythm, S1 normal and S2 normal. Exam reveals no gallop.   No murmur heard.  Pulmonary/Chest: Effort normal and breath sounds normal.   Abdominal: Soft. There is no hepatosplenomegaly. There is no tenderness.   Neurological: He is alert and oriented to person, place, and time.   Skin: Skin is warm and dry. No cyanosis. Nails show no clubbing.   Psychiatric: He has a normal mood and affect. His behavior is normal.       EKG: (EKG has been independently visualized by me and summarized below)    Procedures       Lab Results   Component Value Date    GLUCOSE 89 01/21/2019    BUN 12 01/21/2019    CREATININE 0.87 01/21/2019    EGFRIFNONA 99 01/21/2019    BCR 13.8 01/21/2019    K 4.2 01/21/2019    CO2 28.0 01/21/2019    CALCIUM 8.7 01/21/2019    ALBUMIN 5.21 (H) 01/19/2019    AST 41 (H) 01/19/2019    ALT 26 01/19/2019     Lab Results   Component Value Date    " CHOL 185 01/20/2019    TRIG 63 01/20/2019    HDL 53 01/20/2019     (H) 01/20/2019     Lab Results   Component Value Date    WBC 8.32 01/20/2019    HGB 13.8 01/20/2019    HCT 40.3 01/20/2019    MCV 86.9 01/20/2019     01/20/2019     Lab Results   Component Value Date    CKTOTAL 212 (H) 01/20/2019    CKMB 4.46 01/20/2019    CKMBINDEX 2.1 01/20/2019    TROPONINI 1.659 (C) 01/21/2019           Problem List Items Addressed This Visit        Cardiology Problems    Acute idiopathic myocarditis - Primary    Overview     · [facility] admission for chest pain and elevated troponin (peak 3), 1/19/2019  · Echo (1/19/2019):  LVEF >70%.   No valvular abnormalities.  Trivial pericardial effusion.  · Cardiac catheterization (1/21/19): Normal coronary arteries. Normal LVEF.         Current Assessment & Plan     · Patient's presumed viral myocarditis has symptomatically resolved             39-year-old gentleman with recent diagnosis of idiopathic myocarditis.  His symptoms are resolved with nonsteroidal anti-inflammatory medication.       Return in about 12 months (around 3/5/2020).          BROCK Miner M.D., [facility], [facility]  Interventional Cardiology  3/6/2019  9:21 AM

## 2019-03-05 ENCOUNTER — OFFICE VISIT (OUTPATIENT)
Dept: CARDIOLOGY | Facility: CLINIC | Age: 39
End: 2019-03-05

## 2019-03-05 VITALS
BODY MASS INDEX: 25.87 KG/M2 | WEIGHT: 201.6 LBS | OXYGEN SATURATION: 99 % | HEART RATE: 50 BPM | HEIGHT: 74 IN | DIASTOLIC BLOOD PRESSURE: 72 MMHG | SYSTOLIC BLOOD PRESSURE: 134 MMHG

## 2019-03-05 DIAGNOSIS — I40.1 ACUTE IDIOPATHIC MYOCARDITIS: Primary | ICD-10-CM

## 2019-03-05 PROCEDURE — 99213 OFFICE O/P EST LOW 20 MIN: CPT | Performed by: INTERNAL MEDICINE

## 2019-03-05 RX ORDER — FLUOXETINE HYDROCHLORIDE 20 MG/1
20 CAPSULE ORAL DAILY
Refills: 0 | COMMUNITY
Start: 2019-02-20 | End: 2020-03-17 | Stop reason: SDUPTHER

## 2019-03-06 PROBLEM — R03.0 PREHYPERTENSION: Status: RESOLVED | Noted: 2017-03-22 | Resolved: 2019-03-06

## 2019-03-06 PROBLEM — E78.5 HLD (HYPERLIPIDEMIA): Status: RESOLVED | Noted: 2018-03-08 | Resolved: 2019-03-06

## 2020-03-05 NOTE — PROGRESS NOTES
[facility] at [facility]  Office Visit Note    Encounter Date:03/10/2020    Patient ID: Leon Carnes is a 40 y.o. 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   No valvular abnormalities.  Trivial pericardial effusion.  · Cardiac catheterization (1/21/19): Normal coronary arteries. Normal LVEF.         Current Assessment & Plan     · Denies chest pain            Other    Family history of hypertrophic cardiomyopathy    Overview     · Father with hypertrophic cardiomyopathy  · Echocardiogram  (2008):  Mild LVH, trace MR, EF greater than 60%.  · Cardiac MRI (2011):  Intraventricular septal thickness, 11 mm, within normal limits.  · Echo (2/17/14): LVEF normal with normal diastolic parameters. Normal functioning valves. No evidence of hypertrophic cardiomyopathy             Patient has no signs or symptoms of angina or heart failure.  I would defer any need for statin therapy as he had normal coronaries on cardiac cath but would recommend lifestyle changes to improve cholesterol levels.  We will plan on repeating lipid profile next year       · Lifestyle modification to try and improve cholesterol levels  Return in about 1 year (around 3/10/2021), or if symptoms worsen or fail to improve, for Follow-up with Dr. Miner next visit.              Leon Carnes returns today for follow-up of his acute idiopathic myocarditis.  In January 2019  the patient was hospitalized for myocarditis felt to be from a viral source.  He has a family history of hypertrophic cardiomyopathy.  His echocardiogram last year showed no valvular abnormalities and a normal LVEF.  He does have a history of SVT undergoing a radiofrequency ablation with Dr. Jena in 2001.  He has had no recurrence of arrhythmia since then.  Since his last visit the patient has done well.  He denies any chest pain/pressure or tightness, increased dyspnea, orthopnea, palpitations or syncope.  Patient's last LDL in 2018 was 141.  He does not take any  Cholesterol medicine.  Review of Systems   Constitution: Negative for malaise/fatigue.   Eyes: Negative for vision loss in left eye and vision loss in right eye.   Cardiovascular: Negative for chest pain, dyspnea on exertion, near-syncope, orthopnea, palpitations, paroxysmal nocturnal dyspnea and syncope.   Musculoskeletal: Negative for myalgias.   Neurological: Negative for brief paralysis, excessive daytime sleepiness, focal weakness, numbness, paresthesias and weakness.   All other systems reviewed and are negative.      The patient's past medical, social, family history and ROS reviewed in the patient's electronic medical record.    No Known Allergies      Current Outpatient Medications:   •  FLUoxetine (PROzac) 20 MG capsule, 20 mg Daily., Disp: , Rfl: 0  •  fluticasone (FLONASE) 50 MCG/ACT nasal spray, 1 spray into each nostril Daily As Needed., Disp: , Rfl:     Past Medical History:   Diagnosis Date   • Allergic rhinitis Lifelong   • Anxiety 2015    Recurrent anxiety and distress at home   • Anxiety    • AV yudi re-entry tachycardia (CMS/HCC) 2011    RFA - successful   • Closed left ankle fracture 1988   • Costochondritis    • Hyperlipidemia 2016    Cholesterol 134   • Prehypertension 2016    Average blood pressure 125/85       Past Surgical History:   Procedure Laterality Date   • CARDIAC CATHETERIZATION N/A 1/21/2019    Procedure: Left  "Heart Cath;  Surgeon: Filiberto Miner IV, MD;  Location: [facility] CATH INVASIVE LOCATION;  Service: Cardiovascular   • RADIOFREQUENCY ABLATION      AV nod tach       Family History   Problem Relation Age of Onset   • No Known Problems Mother    • Hypertrophic cardiomyopathy Father          age 72   • Hypertension Father    • Heart disease Maternal Grandfather        Social History     Tobacco Use   • Smoking status: Never Smoker   • Smokeless tobacco: Never Used   Substance Use Topics   • Alcohol use: Yes     Alcohol/week: 2.0 standard drinks     Types: 2 Glasses of wine per week           Blood pressure 126/64, pulse 53, height 188 cm (74\"), weight 96.6 kg (213 lb), SpO2 98 %.  Body mass index is 27.35 kg/m².  Vitals:    03/10/20 1007   Patient Position: Sitting       Physical Exam   Constitutional: He is oriented to person, place, and time. He appears well-developed and well-nourished.   HENT:   Head: Normocephalic and atraumatic.   Eyes: Pupils are equal, round, and reactive to light. No scleral icterus.   Neck: No JVD present. Carotid bruit is not present. No thyromegaly present.   Cardiovascular: Normal rate, regular rhythm, S1 normal and S2 normal. Exam reveals no gallop.   No murmur heard.  Pulmonary/Chest: Effort normal and breath sounds normal.   Abdominal: Soft. He exhibits no mass. There is no hepatosplenomegaly. There is no tenderness.   Neurological: He is alert and oriented to person, place, and time.   Skin: Skin is warm and dry. No cyanosis. Nails show no clubbing.   Psychiatric: He has a normal mood and affect. His behavior is normal.       Data Review (reviewed with patient):     Procedures    Lab Results   Component Value Date    CHOL 185 2019    TRIG 63 2019    HDL 53 2019     (H) 2019    AST 41 (H) 2019    ALT 26 2019       Lab Results   Component Value Date    HGBA1C 5.10 2019         OVIDIO Ricketts    3/10/2020  "

## 2020-03-10 ENCOUNTER — OFFICE VISIT (OUTPATIENT)
Dept: CARDIOLOGY | Facility: CLINIC | Age: 40
End: 2020-03-10

## 2020-03-10 VITALS
SYSTOLIC BLOOD PRESSURE: 126 MMHG | OXYGEN SATURATION: 98 % | HEART RATE: 53 BPM | WEIGHT: 213 LBS | HEIGHT: 74 IN | DIASTOLIC BLOOD PRESSURE: 64 MMHG | BODY MASS INDEX: 27.34 KG/M2

## 2020-03-10 DIAGNOSIS — Z82.49 FAMILY HISTORY OF HYPERTROPHIC CARDIOMYOPATHY: ICD-10-CM

## 2020-03-10 DIAGNOSIS — I40.1 ACUTE IDIOPATHIC MYOCARDITIS: ICD-10-CM

## 2020-03-10 DIAGNOSIS — I47.1 SVT (SUPRAVENTRICULAR TACHYCARDIA) (HCC): Primary | ICD-10-CM

## 2020-03-10 PROCEDURE — 99213 OFFICE O/P EST LOW 20 MIN: CPT | Performed by: NURSE PRACTITIONER

## 2020-03-17 RX ORDER — FLUOXETINE HYDROCHLORIDE 20 MG/1
20 CAPSULE ORAL DAILY
Qty: 30 CAPSULE | Refills: 1 | Status: SHIPPED | OUTPATIENT
Start: 2020-03-17 | End: 2020-05-11 | Stop reason: SDUPTHER

## 2020-05-12 RX ORDER — FLUOXETINE HYDROCHLORIDE 20 MG/1
20 CAPSULE ORAL DAILY
Qty: 30 CAPSULE | Refills: 1 | Status: SHIPPED | OUTPATIENT
Start: 2020-05-12 | End: 2020-09-23 | Stop reason: SDUPTHER

## 2020-08-24 RX ORDER — FLUOXETINE HYDROCHLORIDE 20 MG/1
20 CAPSULE ORAL DAILY
Qty: 30 CAPSULE | Refills: 1 | OUTPATIENT
Start: 2020-08-24

## 2020-08-24 NOTE — TELEPHONE ENCOUNTER
Sent patient message on [employer] informing of how to schedule video visit for med refills

## 2020-09-23 ENCOUNTER — TELEMEDICINE (OUTPATIENT)
Dept: FAMILY MEDICINE CLINIC | Facility: CLINIC | Age: 40
End: 2020-09-23

## 2020-09-23 DIAGNOSIS — J30.89 PERENNIAL ALLERGIC RHINITIS: ICD-10-CM

## 2020-09-23 DIAGNOSIS — F41.9 ANXIETY: Primary | ICD-10-CM

## 2020-09-23 PROCEDURE — 99212 OFFICE O/P EST SF 10 MIN: CPT | Performed by: FAMILY MEDICINE

## 2020-09-23 RX ORDER — FLUOXETINE HYDROCHLORIDE 20 MG/1
20 CAPSULE ORAL DAILY
Qty: 90 CAPSULE | Refills: 3 | Status: SHIPPED | OUTPATIENT
Start: 2020-09-23 | End: 2021-11-29

## 2020-09-23 RX ORDER — FLUTICASONE PROPIONATE 50 MCG
1 SPRAY, SUSPENSION (ML) NASAL DAILY PRN
Qty: 1 BOTTLE | Refills: 11 | Status: SHIPPED | OUTPATIENT
Start: 2020-09-23

## 2021-11-29 DIAGNOSIS — F41.9 ANXIETY: ICD-10-CM

## 2021-11-29 RX ORDER — FLUOXETINE HYDROCHLORIDE 20 MG/1
CAPSULE ORAL
Qty: 90 CAPSULE | Refills: 2 | Status: SHIPPED | OUTPATIENT
Start: 2021-11-29 | End: 2021-11-29

## 2021-11-29 RX ORDER — FLUOXETINE HYDROCHLORIDE 20 MG/1
CAPSULE ORAL
Qty: 90 CAPSULE | Refills: 2 | Status: SHIPPED | OUTPATIENT
Start: 2021-11-29 | End: 2022-10-19 | Stop reason: SDUPTHER

## 2021-11-29 NOTE — TELEPHONE ENCOUNTER
Rx Refill Note  Requested Prescriptions     Pending Prescriptions Disp Refills   • FLUoxetine (PROzac) 20 MG capsule [Pharmacy Med Name: FLUoxetine HCL 20 MG CAPSULE] 90 capsule 3     Sig: TAKE ONE CAPSULE BY MOUTH DAILY      Last office visit with prescribing clinician: 9/23/20      Next office visit with prescribing clinician: not scheduled           Maricruz Steen MA  11/29/21, 11:57 EST

## 2022-10-19 DIAGNOSIS — F41.9 ANXIETY: ICD-10-CM

## 2022-10-21 RX ORDER — FLUOXETINE HYDROCHLORIDE 20 MG/1
20 CAPSULE ORAL DAILY
Qty: 5 CAPSULE | Refills: 0 | Status: SHIPPED | OUTPATIENT
Start: 2022-10-21 | End: 2022-10-25 | Stop reason: SDUPTHER

## 2022-10-21 NOTE — TELEPHONE ENCOUNTER
[facility]UB ADVISED-APPT OCTAVIO MUELLERIN HAS AN APPT FOR [id number].  CAN HE GET ENOUGH OF THIS RX UNTIL THEN?

## 2022-10-21 NOTE — TELEPHONE ENCOUNTER
Rx Refill Note  Requested Prescriptions     Pending Prescriptions Disp Refills   • FLUoxetine (PROzac) 20 MG capsule 90 capsule 2     Sig: Take 1 capsule by mouth Daily.      Last office visit with prescribing clinician: 9/23/2020      Next office visit with prescribing clinician: Visit date not found            Alma Meyer MA  10/21/22, 08:42 EDT     Patient has not been seen since 9/23/20 and will need to be seen before any refills can be given called and left vm for patient to return call    OK FOR HUB TO RELAY MESSAGE AND SCHEDULE APPOINTMENT

## 2022-10-25 ENCOUNTER — OFFICE VISIT (OUTPATIENT)
Dept: FAMILY MEDICINE CLINIC | Facility: CLINIC | Age: 42
End: 2022-10-25

## 2022-10-25 VITALS
HEART RATE: 53 BPM | HEIGHT: 74 IN | WEIGHT: 211.6 LBS | DIASTOLIC BLOOD PRESSURE: 92 MMHG | SYSTOLIC BLOOD PRESSURE: 132 MMHG | BODY MASS INDEX: 27.16 KG/M2 | OXYGEN SATURATION: 99 %

## 2022-10-25 DIAGNOSIS — F41.9 ANXIETY: Primary | ICD-10-CM

## 2022-10-25 DIAGNOSIS — I10 HYPERTENSION, UNSPECIFIED TYPE: ICD-10-CM

## 2022-10-25 PROCEDURE — 90686 IIV4 VACC NO PRSV 0.5 ML IM: CPT | Performed by: PHYSICIAN ASSISTANT

## 2022-10-25 PROCEDURE — 99214 OFFICE O/P EST MOD 30 MIN: CPT | Performed by: PHYSICIAN ASSISTANT

## 2022-10-25 PROCEDURE — 90471 IMMUNIZATION ADMIN: CPT | Performed by: PHYSICIAN ASSISTANT

## 2022-10-25 RX ORDER — FLUOXETINE HYDROCHLORIDE 20 MG/1
20 CAPSULE ORAL DAILY
Qty: 5 CAPSULE | Refills: 0 | Status: SHIPPED | OUTPATIENT
Start: 2022-10-25 | End: 2022-10-25 | Stop reason: SDUPTHER

## 2022-10-25 RX ORDER — FLUOXETINE HYDROCHLORIDE 20 MG/1
20 CAPSULE ORAL DAILY
Qty: 90 CAPSULE | Refills: 0 | Status: SHIPPED | OUTPATIENT
Start: 2022-10-25 | End: 2022-11-08 | Stop reason: SDUPTHER

## 2022-10-25 RX ORDER — LISINOPRIL 10 MG/1
10 TABLET ORAL DAILY
Qty: 30 TABLET | Refills: 1 | Status: SHIPPED | OUTPATIENT
Start: 2022-10-25 | End: 2022-11-08 | Stop reason: SDUPTHER

## 2022-10-25 NOTE — PROGRESS NOTES
"    Chief Complaint   Patient presents with   • Anxiety     Medication refill        HPI     Leon Carnes is a pleasant 42 y.o. male with a PMH of pre-hypertension and anxiety who presents for evaluation of \"chief complaint.\"     The patient states his blood pressure has always been borderline high even when he was in shape. He does have a family history of hypertension (father). He states readings are typically in the 130s/80-90s at home. His wife is a nurse practitioner and counseled him to be seen.    He states his anxiety is \"doing a lot better.\" He wonders if he still needs the Prozac.     Past Medical History:   Diagnosis Date   • Allergic rhinitis Lifelong   • Anxiety     Recurrent anxiety and distress at home   • Anxiety    • AV yudi re-entry tachycardia (HCC)     RFA - successful   • Closed left ankle fracture    • Costochondritis    • Hyperlipidemia 2016    Cholesterol 134   • Prehypertension     Average blood pressure 125/85       Past Surgical History:   Procedure Laterality Date   • CARDIAC CATHETERIZATION N/A 2019    Procedure: Left Heart Cath;  Surgeon: Filiberto Miner IV, MD;  Location: [facility] CATH INVASIVE LOCATION;  Service: Cardiovascular   • RADIOFREQUENCY ABLATION  2011    AV nod tach       Family History   Problem Relation Age of Onset   • No Known Problems Mother    • Hypertrophic cardiomyopathy Father          age 72   • Hypertension Father    • Heart disease Maternal Grandfather        Social History     Socioeconomic History   • Marital status:    Tobacco Use   • Smoking status: Never   • Smokeless tobacco: Never   Substance and Sexual Activity   • Alcohol use: Yes     Alcohol/week: 2.0 standard drinks     Types: 2 Glasses of wine per week   • Drug use: No   • Sexual activity: Yes     Partners: Female       No Known Allergies    ROS    Review of Systems   Constitutional: Positive for fatigue.   Eyes: Negative for blurred vision and visual " disturbance.   Respiratory: Negative for cough and shortness of breath.    Cardiovascular: Negative for chest pain.   Neurological: Negative for dizziness and headache.   Psychiatric/Behavioral: Positive for stress. Negative for sleep disturbance and depressed mood. The patient is not nervous/anxious.        Vitals:    10/25/22 1109   BP: 132/92   Pulse: 53   SpO2: 99%     Body mass index is 27.17 kg/m².      Current Outpatient Medications:   •  FLUoxetine (PROzac) 20 MG capsule, Take 1 capsule by mouth Daily., Disp: 90 capsule, Rfl: 0  •  fluticasone (FLONASE) 50 MCG/ACT nasal spray, 1 spray into the nostril(s) as directed by provider Daily As Needed for Allergies., Disp: 1 bottle, Rfl: 11  •  lisinopril (PRINIVIL,ZESTRIL) 10 MG tablet, Take 1 tablet by mouth Daily., Disp: 30 tablet, Rfl: 1    PE    Physical Exam  Vitals reviewed.   Constitutional:       General: He is not in acute distress.     Appearance: He is well-developed.   HENT:      Head: Normocephalic and atraumatic.   Eyes:      Conjunctiva/sclera: Conjunctivae normal.   Cardiovascular:      Rate and Rhythm: Normal rate and regular rhythm.      Heart sounds: Normal heart sounds. No murmur heard.  Pulmonary:      Effort: Pulmonary effort is normal.      Breath sounds: Normal breath sounds.   Musculoskeletal:      Cervical back: Normal range of motion.   Skin:     General: Skin is warm and dry.   Neurological:      Mental Status: He is alert.      Gait: Gait normal.   Psychiatric:         Speech: Speech normal.         Behavior: Behavior normal.          A/P    Problem List Items Addressed This Visit        Mental Health    Anxiety - Primary  -Controlled on Prozac  -Continue current treatment    Relevant Medications    FLUoxetine (PROzac) 20 MG capsule   Other Visit Diagnoses     Hypertension, unspecified type      -BP for me 144/102 L arm and 150/100 R arm  -Home readings are also high.  -Start lisinopril 10 mg qd.  -Encouraged low sodium diet.  -Patient  will follow-up with his PCP for a physical and BP check in 2 weeks. Recommended labs at that time    Relevant Medications    lisinopril (PRINIVIL,ZESTRIL) 10 MG tablet          Plan of care was reviewed with patient at the conclusion of today's visit. Education was provided regarding diagnoses, management, prescribed or recommended OTC products, and the importance of compliance with follow-up appointments. The patient was counseled regarding the risks, benefits, and possible side-effects of treatment. I advised the patient to keep me informed of any acute changes in their status including new, worsening, or persistent symptoms. Patient expresses understanding and agreement with the management plan.        DILIA Farris

## 2022-11-08 ENCOUNTER — OFFICE VISIT (OUTPATIENT)
Dept: FAMILY MEDICINE CLINIC | Facility: CLINIC | Age: 42
End: 2022-11-08

## 2022-11-08 VITALS
TEMPERATURE: 97.7 F | WEIGHT: 209.8 LBS | HEART RATE: 51 BPM | OXYGEN SATURATION: 98 % | BODY MASS INDEX: 26.94 KG/M2 | DIASTOLIC BLOOD PRESSURE: 88 MMHG | SYSTOLIC BLOOD PRESSURE: 122 MMHG

## 2022-11-08 DIAGNOSIS — L74.510 HYPERHIDROSIS OF AXILLA: ICD-10-CM

## 2022-11-08 DIAGNOSIS — I10 PRIMARY HYPERTENSION: Primary | ICD-10-CM

## 2022-11-08 DIAGNOSIS — F41.9 ANXIETY: ICD-10-CM

## 2022-11-08 PROCEDURE — 99214 OFFICE O/P EST MOD 30 MIN: CPT | Performed by: FAMILY MEDICINE

## 2022-11-08 RX ORDER — LISINOPRIL 10 MG/1
10 TABLET ORAL DAILY
Qty: 90 TABLET | Refills: 3 | Status: SHIPPED | OUTPATIENT
Start: 2022-11-08

## 2022-11-08 RX ORDER — ALUMINUM CHLORIDE 20 %
SOLUTION, NON-ORAL TOPICAL NIGHTLY
Qty: 60 ML | Refills: 11 | Status: SHIPPED | OUTPATIENT
Start: 2022-11-08

## 2022-11-08 RX ORDER — FLUOXETINE HYDROCHLORIDE 20 MG/1
20 CAPSULE ORAL DAILY
Qty: 90 CAPSULE | Refills: 3 | Status: SHIPPED | OUTPATIENT
Start: 2022-11-08

## 2022-11-08 NOTE — PROGRESS NOTES
Established Patient Office Visit      Patient Name: Leon Carnes  : 1980   MRN: [id number]   Care Team: Patient Care Team:  South Hollingsworth DO as PCP - General (Family Medicine)    Chief Complaint:    Chief Complaint   Patient presents with   • Follow-up     2 week follow-up HTN   • Hypertension       History of Present Illness: Leon Carnes is a 42 y.o. male who is here today for 2 week follow-up on hypertension.    HPI    Leon s a pleasant 42-year-old male with past history of longstanding pre-hypertension with a significant family history of hypertension who presents today for 2-week follow-up on his blood pressure.  He was seen 2 weeks ago by Michael here in the office, who started him on lisinopril 10 mg daily.  Leon is also on fluoxetine 20 mg daily for chronic anxiety.    The patient reports that he has been doing well. He states that he has not had time to exercise, but he does a lot better when he jogs. He denies any trouble with the medication.    He reports that when he turned 42 years old, his armpits are drenched in sweat. He denies any fatigue or sexual dysfunction. He denies any problems with the fluoxetine.    This patient is accompanied by their self who contributes to the history of their care.    The following portions of the patient's history were reviewed and updated as appropriate: allergies, current medications, past family history, past medical history, past social history, past surgical history and problem list.    Subjective      Review of Systems:   Review of Systems - See HPI    Past Medical History:   Past Medical History:   Diagnosis Date   • Allergic rhinitis Lifelong   • Anxiety 2015    Recurrent anxiety and distress at home   • Anxiety    • AV yudi re-entry tachycardia (HCC) 2011    RFA - successful   • Closed left ankle fracture 1988   • Costochondritis    • Hyperlipidemia 2016    Cholesterol 134   • Prehypertension 2016    Average blood pressure 125/85        Past Surgical History:   Past Surgical History:   Procedure Laterality Date   • CARDIAC CATHETERIZATION N/A 2019    Procedure: Left Heart Cath;  Surgeon: Filiberto Miner IV, MD;  Location: [facility] INVASIVE LOCATION;  Service: Cardiovascular   • RADIOFREQUENCY ABLATION      AV nod tach       Family History:   Family History   Problem Relation Age of Onset   • No Known Problems Mother    • Hypertrophic cardiomyopathy Father          age 72   • Hypertension Father    • Heart disease Maternal Grandfather        Social History:   Social History     Socioeconomic History   • Marital status:    Tobacco Use   • Smoking status: Never   • Smokeless tobacco: Never   Substance and Sexual Activity   • Alcohol use: Yes     Alcohol/week: 2.0 standard drinks     Types: 2 Glasses of wine per week   • Drug use: No   • Sexual activity: Yes     Partners: Female       Tobacco History:   Social History     Tobacco Use   Smoking Status Never   Smokeless Tobacco Never       Medications:     Current Outpatient Medications:   •  FLUoxetine (PROzac) 20 MG capsule, Take 1 capsule by mouth Daily., Disp: 90 capsule, Rfl: 3  •  fluticasone (FLONASE) 50 MCG/ACT nasal spray, 1 spray into the nostril(s) as directed by provider Daily As Needed for Allergies., Disp: 1 bottle, Rfl: 11  •  lisinopril (PRINIVIL,ZESTRIL) 10 MG tablet, Take 1 tablet by mouth Daily., Disp: 90 tablet, Rfl: 3  •  aluminum chloride (Drysol) 20 % external solution, Apply  topically to the appropriate area as directed Every Night., Disp: 60 mL, Rfl: 11    Allergies:   No Known Allergies    Objective   Objective     Physical Exam:  Vital Signs:   Vitals:    22 0924   BP: 122/88   BP Location: Left arm   Patient Position: Sitting   Cuff Size: Adult   Pulse: 51   Temp: 97.7 °F (36.5 °C)   TempSrc: Infrared   SpO2: 98%   Weight: 95.2 kg (209 lb 12.8 oz)     Body mass index is 26.94 kg/m².     Physical Exam  Nursing note reviewed  Const:  NAD, A&Ox4, Pleasant, Cooperative  Eyes: EOMI, no conjunctivitis  ENT: No nasal discharge present, neck supple  Cardiac: Regular rate and rhythm, no cyanosis  Resp: Respiratory rate within normal limits, no increased work of breathing, no audible wheezing or retractions noted  GI: No distention or ascites  MSK: Motor and sensation grossly intact in bilateral upper extremities  Neurologic: CN II-XII grossly intact  Psych: Appropriate mood and behavior.  Skin: Warm, dry  Procedures/Radiology     Procedures  No radiology results for the last 7 days     Assessment & Plan   Assessment / Plan      Assessment/Plan:   Problems Addressed This Visit  Diagnoses and all orders for this visit:    1. Primary hypertension (Primary)  -     Basic Metabolic Panel; Future  -     lisinopril (PRINIVIL,ZESTRIL) 10 MG tablet; Take 1 tablet by mouth Daily.  Dispense: 90 tablet; Refill: 3    2. Hyperhidrosis of axilla  -     aluminum chloride (Drysol) 20 % external solution; Apply  topically to the appropriate area as directed Every Night.  Dispense: 60 mL; Refill: 11    3. Anxiety  -     FLUoxetine (PROzac) 20 MG capsule; Take 1 capsule by mouth Daily.  Dispense: 90 capsule; Refill: 3      Problem List Items Addressed This Visit        Cardiac and Vasculature    Hypertension - Primary (Chronic)    Overview     Longstanding pre-hypertension, newly started on lisinopril 10 mg daily in October 2022         Relevant Medications    lisinopril (PRINIVIL,ZESTRIL) 10 MG tablet    Other Relevant Orders    Basic Metabolic Panel       Mental Health    Anxiety    Relevant Medications    FLUoxetine (PROzac) 20 MG capsule   Other Visit Diagnoses     Hyperhidrosis of axilla        Relevant Medications    aluminum chloride (Drysol) 20 % external solution        1. Hypertension.  - The patient's blood pressure is elevated in the office today. He will continue his current medication regimen. We will check his kidney function and electrolytes today.    2.  Hyperhidrosis.  - I advised the patient to apply Drysol at night.    There are no Patient Instructions on file for this visit.    Follow Up:   Return in about 1 year (around 11/8/2023) for Annual.    DO MAINOR Peace RD  [facility]  [address]  [facility] [address]  Fax [phone]  Phone [phone]      Transcribed from ambient dictation for South Hollingsworth DO by Cyndi Mae.  11/08/22   09:57 EST    Patient or patient representative verbalized consent to the visit recording.  I have personally performed the services described in this document as transcribed by the above individual, and it is both accurate and complete.

## 2022-12-06 ENCOUNTER — PATIENT MESSAGE (OUTPATIENT)
Dept: FAMILY MEDICINE CLINIC | Facility: CLINIC | Age: 42
End: 2022-12-06

## 2023-02-15 ENCOUNTER — OFFICE VISIT (OUTPATIENT)
Dept: FAMILY MEDICINE CLINIC | Facility: CLINIC | Age: 43
End: 2023-02-15
Payer: COMMERCIAL

## 2023-02-15 ENCOUNTER — LAB (OUTPATIENT)
Dept: LAB | Facility: HOSPITAL | Age: 43
End: 2023-02-15
Payer: COMMERCIAL

## 2023-02-15 VITALS
BODY MASS INDEX: 26.56 KG/M2 | OXYGEN SATURATION: 98 % | TEMPERATURE: 97.8 F | WEIGHT: 207 LBS | HEART RATE: 61 BPM | HEIGHT: 74 IN | SYSTOLIC BLOOD PRESSURE: 130 MMHG | DIASTOLIC BLOOD PRESSURE: 82 MMHG

## 2023-02-15 DIAGNOSIS — Z00.00 WELL ADULT EXAM: Primary | ICD-10-CM

## 2023-02-15 DIAGNOSIS — Z13.29 SCREENING FOR ENDOCRINE DISORDER: ICD-10-CM

## 2023-02-15 DIAGNOSIS — Z13.220 SCREENING FOR HYPERLIPIDEMIA: ICD-10-CM

## 2023-02-15 DIAGNOSIS — I10 PRIMARY HYPERTENSION: ICD-10-CM

## 2023-02-15 DIAGNOSIS — I10 PRIMARY HYPERTENSION: Chronic | ICD-10-CM

## 2023-02-15 DIAGNOSIS — J02.9 SORE THROAT: ICD-10-CM

## 2023-02-15 DIAGNOSIS — Z00.00 WELL ADULT EXAM: ICD-10-CM

## 2023-02-15 PROBLEM — I40.1 ACUTE IDIOPATHIC MYOCARDITIS: Status: RESOLVED | Noted: 2019-01-19 | Resolved: 2023-02-15

## 2023-02-15 LAB
ANION GAP SERPL CALCULATED.3IONS-SCNC: 11 MMOL/L (ref 5–15)
BILIRUB UR QL STRIP: NEGATIVE
BUN SERPL-MCNC: 13 MG/DL (ref 6–20)
BUN/CREAT SERPL: 12 (ref 7–25)
CALCIUM SPEC-SCNC: 9.6 MG/DL (ref 8.6–10.5)
CHLORIDE SERPL-SCNC: 98 MMOL/L (ref 98–107)
CHOLEST SERPL-MCNC: 253 MG/DL (ref 0–200)
CLARITY UR: CLEAR
CO2 SERPL-SCNC: 27 MMOL/L (ref 22–29)
COLOR UR: YELLOW
CREAT SERPL-MCNC: 1.08 MG/DL (ref 0.76–1.27)
DEPRECATED RDW RBC AUTO: 40.6 FL (ref 37–54)
EGFRCR SERPLBLD CKD-EPI 2021: 88.4 ML/MIN/1.73
ERYTHROCYTE [DISTWIDTH] IN BLOOD BY AUTOMATED COUNT: 12.6 % (ref 12.3–15.4)
EXPIRATION DATE: NORMAL
GLUCOSE SERPL-MCNC: 90 MG/DL (ref 65–99)
GLUCOSE UR STRIP-MCNC: NEGATIVE MG/DL
HBA1C MFR BLD: 5.1 % (ref 4.8–5.6)
HCT VFR BLD AUTO: 45.3 % (ref 37.5–51)
HDLC SERPL-MCNC: 60 MG/DL (ref 40–60)
HGB BLD-MCNC: 15.4 G/DL (ref 13–17.7)
HGB UR QL STRIP.AUTO: NEGATIVE
INTERNAL CONTROL: NORMAL
KETONES UR QL STRIP: NEGATIVE
LDLC SERPL CALC-MCNC: 175 MG/DL (ref 0–100)
LDLC/HDLC SERPL: 2.87 {RATIO}
LEUKOCYTE ESTERASE UR QL STRIP.AUTO: NEGATIVE
Lab: NORMAL
MCH RBC QN AUTO: 30 PG (ref 26.6–33)
MCHC RBC AUTO-ENTMCNC: 34 G/DL (ref 31.5–35.7)
MCV RBC AUTO: 88.3 FL (ref 79–97)
NITRITE UR QL STRIP: NEGATIVE
PH UR STRIP.AUTO: 6 [PH] (ref 5–8)
PLATELET # BLD AUTO: 223 10*3/MM3 (ref 140–450)
PMV BLD AUTO: 9.9 FL (ref 6–12)
POTASSIUM SERPL-SCNC: 4.5 MMOL/L (ref 3.5–5.2)
PROT UR QL STRIP: NEGATIVE
RBC # BLD AUTO: 5.13 10*6/MM3 (ref 4.14–5.8)
S PYO AG THROAT QL: NEGATIVE
SODIUM SERPL-SCNC: 136 MMOL/L (ref 136–145)
SP GR UR STRIP: 1.01 (ref 1–1.03)
TRIGL SERPL-MCNC: 104 MG/DL (ref 0–150)
TSH SERPL DL<=0.05 MIU/L-ACNC: 0.98 UIU/ML (ref 0.27–4.2)
UROBILINOGEN UR QL STRIP: NORMAL
VLDLC SERPL-MCNC: 18 MG/DL (ref 5–40)
WBC NRBC COR # BLD: 5.74 10*3/MM3 (ref 3.4–10.8)

## 2023-02-15 PROCEDURE — 87880 STREP A ASSAY W/OPTIC: CPT | Performed by: FAMILY MEDICINE

## 2023-02-15 PROCEDURE — 81003 URINALYSIS AUTO W/O SCOPE: CPT

## 2023-02-15 PROCEDURE — 99396 PREV VISIT EST AGE 40-64: CPT | Performed by: FAMILY MEDICINE

## 2023-02-15 PROCEDURE — 80061 LIPID PANEL: CPT

## 2023-02-15 PROCEDURE — 83036 HEMOGLOBIN GLYCOSYLATED A1C: CPT

## 2023-02-15 PROCEDURE — 80048 BASIC METABOLIC PNL TOTAL CA: CPT

## 2023-02-15 PROCEDURE — 84443 ASSAY THYROID STIM HORMONE: CPT

## 2023-02-15 PROCEDURE — 85027 COMPLETE CBC AUTOMATED: CPT

## 2023-02-15 NOTE — PROGRESS NOTES
Annual Well Adult Visit     Patient Name: Leon Carnes  : 1980   MRN: [id number]   Care Team: Patient Care Team:  South Hollingsworth DO as PCP - General (Family Medicine)    Chief Complaint:    Chief Complaint   Patient presents with   • Annual Exam     Yearly   • Hypertension     Follow up    • Anxiety     Follow up        History of Present Illness: Leon Carnes is a 43 y.o. male who presents today for annual physical exam and preventative care.    HPI    His daughter tested positive for strep throat earlier in the week, he is having little bit of sore throat and drainage and requests strep test today.    He was started on lisinopril last October and has been doing well.  No side effects from this medication, reports good compliance.  Blood pressure has been under much better control.  He had a metabolic panel ordered after his last visit in November but has not had this completed yet.  He will have this done today.  He reports that work has been going well, he feels about his lowest stress level as he has been in a long time.  The fluoxetine 20 mg is doing well.  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  She seems to be enjoying it.    Patient is looking to get into running again over the next few months, he has come off this little bit over the last 6 months but definitely does better from a mental health and physical health perspective when he jogs.    He uses the Drysol as needed for the hyperhidrosis in his axillae and is doing well.  He sees a dentist every 6 months, has never had an eye exam.     This patient is accompanied by their self who contributes to the history of their care.    The following portions of the patient's history were reviewed and updated as appropriate: allergies, current medications, past family history, past medical history, past social history, past surgical history and problem list.       Allied  Screenings    N/A         Date      Eye Exam       []              []   Up to date    Location:   [x]   Recommended       Counseled every 2 years in those without known issues, yearly if wearing glasses or contacts      Dental Exam       []           [x]   Up to date   Location:   []   Recommended       Counseled, recommended cleanings every 6 months, daily brushing and flossing        Skin Cancer Screening        []            []   Up to date   Location:   [x]   Recommended Counseled on regular sunscreen wear, self-skin checks      Obesity Counseling        [x]        []   Complete   []   Nutritionist referral   []   Declined Counseled on moderate portions, low meat diet focusing on whole foods and plant-based protein       Subjective      Review of Systems:   Review of Systems - See HPI    Past Medical History:   Past Medical History:   Diagnosis Date   • Acute idiopathic myocarditis 2019    · [facility] admission for chest pain and elevated troponin (peak 3), 2019 · Echo (2019):  LVEF >70%.   No valvular abnormalities.  Trivial pericardial effusion. · Cardiac catheterization (19): Normal coronary arteries. Normal LVEF.   • Allergic rhinitis Lifelong   • Anxiety     Recurrent anxiety and distress at home   • Anxiety    • AV yudi re-entry tachycardia (HCC)     RFA - successful   • Closed left ankle fracture    • Costochondritis    • Hyperlipidemia 2016    Cholesterol 134   • Prehypertension 2016    Average blood pressure 125/85       Past Surgical History:   Past Surgical History:   Procedure Laterality Date   • CARDIAC CATHETERIZATION N/A 2019    Procedure: Left Heart Cath;  Surgeon: Filiberto Miner IV, MD;  [facility];  Service: Cardiovascular   • RADIOFREQUENCY ABLATION      AV nod tach       Family History:   Family History   Problem Relation Age of Onset   • No Known Problems Mother    • Hypertrophic cardiomyopathy Father           "age 72   • Hypertension Father    • Heart disease Maternal Grandfather        Social History:   Social History     Socioeconomic History   • Marital status:    Tobacco Use   • Smoking status: Never   • Smokeless tobacco: Never   Substance and Sexual Activity   • Alcohol use: Yes     Alcohol/week: 2.0 standard drinks     Types: 2 Glasses of wine per week   • Drug use: No   • Sexual activity: Yes     Partners: Female       Tobacco History:   Social History     Tobacco Use   Smoking Status Never   Smokeless Tobacco Never       Medications:     Current Outpatient Medications:   •  aluminum chloride (Drysol) 20 % external solution, Apply  topically to the appropriate area as directed Every Night., Disp: 60 mL, Rfl: 11  •  FLUoxetine (PROzac) 20 MG capsule, Take 1 capsule by mouth Daily., Disp: 90 capsule, Rfl: 3  •  fluticasone (FLONASE) 50 MCG/ACT nasal spray, 1 spray into the nostril(s) as directed by provider Daily As Needed for Allergies., Disp: 1 bottle, Rfl: 11  •  lisinopril (PRINIVIL,ZESTRIL) 10 MG tablet, Take 1 tablet by mouth Daily., Disp: 90 tablet, Rfl: 3    Immunizations:  Immunization History   Administered Date(s) Administered   • COVID-19 (MODERNA) 1st, 2nd, 3rd Dose Only 02/13/2021, 03/13/2021, 11/02/2021   • FluLaval/Fluzone >6mos 09/21/2017, 10/08/2018, 10/25/2022   • FluMist 2-49yrs 10/01/2017   • Flublok 18+yrs 11/02/2021   • Hep B, Adolescent or Pediatric 03/03/1999, 04/19/1999, 09/22/1999   • Influenza, Unspecified 10/08/2018   • Tdap 03/08/2018   • influenza Split 12/06/2016        Allergies:   No Known Allergies    Objective   Objective     Physical Exam:  Vital Signs:   Vitals:    02/15/23 0943   BP: 130/82   Pulse: 61   Temp: 97.8 °F (36.6 °C)   SpO2: 98%   Weight: 93.9 kg (207 lb)   Height: 188 cm (74.02\")     Body mass index is 26.57 kg/m².     Physical Exam  Nursing note reviewed  General: Patient is well-nourished, well-developed, and in no acute distress.  HEENT: Normocephalic with " no contusions noted, no ptosis or palsy. Pupils equally round and reactive to light, extraocular movements intact. Patient holds steady gaze, can follow to midline. Hearing grossly normal without deficit, exterior auricles normal, tympanic membranes normal without erythema or bulging.  Lymphatic: Posterior auricular, cervical, submandibular, supraclavicular, axillary lymphatic sites palpated without abnormality  Cardiovascular: Normal study rate without ectopy. PMI palpated, normal. Normal S1, S2. No murmurs rubs or gallops.  Respiratory: No tenderness to palpation on the chest wall, lungs clear to auscultation bilaterally, no wheezes, rales, or rhonchi. No pleural friction rubs.  Gastrointestinal: Bowel sounds present, normoactive globally. No bruit noted. Nontender, nondistended. Normal percussive exam, no hepatomegaly, no splenomegaly. No hernias, scars, gross lesions.  Extremities: No cyanosis or edema, 2+ pulses bilaterally, reflexes normal. Capillary refill time normal.  MSK: Normal gait and station. 5/5 strength globally.  Neuro: Cranial nerves II-XII grossly intact. Patient alert and oriented x3.  PHQ-9 Depression Screening  0  Procedures/Radiology     Procedures  No radiology results for the last 7 days     Assessment & Plan   Assessment / Plan      Assessment/Plan:   Problems Addressed This Visit  Diagnoses and all orders for this visit:    1. Well adult exam (Primary)  -     CBC (No Diff); Future  -     Lipid Panel; Future  -     Hemoglobin A1c; Future  -     Urinalysis With Microscopic If Indicated (No Culture) - Urine, Clean Catch; Future  -     TSH; Future    2. Primary hypertension    3. Sore throat  -     POCT rapid strep A    4. Screening for endocrine disorder    5. Screening for hyperlipidemia      Problem List Items Addressed This Visit        Cardiac and Vasculature    Hypertension (Chronic)    Overview     Longstanding pre-hypertension, newly started on lisinopril 10 mg daily in October 2022         Other Visit Diagnoses     Well adult exam    -  Primary    Relevant Orders    CBC (No Diff)    Lipid Panel    Hemoglobin A1c    Urinalysis With Microscopic If Indicated (No Culture) - Urine, Clean Catch    TSH    Sore throat        Relevant Orders    POCT rapid strep A (Completed)    Screening for endocrine disorder        Screening for hyperlipidemia              See patient diagnoses and orders along with patient instructions for assessment, plan, and changes to care for patient.    The preventative exam has been reviewed in detail.  The patient has been fully counseled on preventative guidelines for vaccines, cancer screenings, and other health maintenance needs. The patient was counseled on maintaining a lifestyle to promote good health and to minimize chronic diseases.  The patient has been assisted with scheduling healthcare procedures for the coming year and given a written document outlining these recommendations. Age-appropriate screening measures have been ordered for the patient today as indicated above.    There are no Patient Instructions on file for this visit.    Follow Up:   Return in about 1 year (around 2/15/2024) for Annual.    DO MAINOR Peace RD  [facility]  [address]  [facility] [address]  Fax [phone]  Phone [phone]

## 2024-01-11 DIAGNOSIS — F41.9 ANXIETY: ICD-10-CM

## 2024-01-11 DIAGNOSIS — I10 PRIMARY HYPERTENSION: ICD-10-CM

## 2024-01-11 RX ORDER — LISINOPRIL 10 MG/1
10 TABLET ORAL DAILY
Qty: 90 TABLET | Refills: 0 | Status: SHIPPED | OUTPATIENT
Start: 2024-01-11

## 2024-01-11 RX ORDER — FLUOXETINE HYDROCHLORIDE 20 MG/1
20 CAPSULE ORAL DAILY
Qty: 90 CAPSULE | Refills: 0 | Status: SHIPPED | OUTPATIENT
Start: 2024-01-11

## 2024-02-15 ENCOUNTER — OFFICE VISIT (OUTPATIENT)
Dept: FAMILY MEDICINE CLINIC | Facility: CLINIC | Age: 44
End: 2024-02-15
Payer: COMMERCIAL

## 2024-02-15 ENCOUNTER — LAB (OUTPATIENT)
Dept: LAB | Facility: HOSPITAL | Age: 44
End: 2024-02-15
Payer: COMMERCIAL

## 2024-02-15 VITALS
HEIGHT: 74 IN | BODY MASS INDEX: 25.41 KG/M2 | WEIGHT: 198 LBS | DIASTOLIC BLOOD PRESSURE: 72 MMHG | HEART RATE: 60 BPM | OXYGEN SATURATION: 99 % | SYSTOLIC BLOOD PRESSURE: 118 MMHG

## 2024-02-15 DIAGNOSIS — Z82.49 FAMILY HISTORY OF HYPERTROPHIC CARDIOMYOPATHY: ICD-10-CM

## 2024-02-15 DIAGNOSIS — J30.89 PERENNIAL ALLERGIC RHINITIS: ICD-10-CM

## 2024-02-15 DIAGNOSIS — Z86.79 HISTORY OF VIRAL MYOCARDITIS: ICD-10-CM

## 2024-02-15 DIAGNOSIS — Z13.220 SCREENING FOR HYPERLIPIDEMIA: ICD-10-CM

## 2024-02-15 DIAGNOSIS — Z00.00 WELL ADULT EXAM: Primary | ICD-10-CM

## 2024-02-15 DIAGNOSIS — E55.9 VITAMIN D DEFICIENCY: ICD-10-CM

## 2024-02-15 DIAGNOSIS — Z13.29 SCREENING FOR ENDOCRINE DISORDER: ICD-10-CM

## 2024-02-15 DIAGNOSIS — F41.9 ANXIETY: ICD-10-CM

## 2024-02-15 DIAGNOSIS — I10 PRIMARY HYPERTENSION: Chronic | ICD-10-CM

## 2024-02-15 DIAGNOSIS — K64.0 GRADE I HEMORRHOIDS: ICD-10-CM

## 2024-02-15 DIAGNOSIS — Z00.00 WELL ADULT EXAM: ICD-10-CM

## 2024-02-15 DIAGNOSIS — I47.10 SVT (SUPRAVENTRICULAR TACHYCARDIA): ICD-10-CM

## 2024-02-15 DIAGNOSIS — L74.510 HYPERHIDROSIS OF AXILLA: ICD-10-CM

## 2024-02-15 LAB
25(OH)D3 SERPL-MCNC: 23.2 NG/ML (ref 30–100)
ALBUMIN SERPL-MCNC: 5.1 G/DL (ref 3.5–5.2)
ALBUMIN/GLOB SERPL: 1.9 G/DL
ALP SERPL-CCNC: 24 U/L (ref 39–117)
ALT SERPL W P-5'-P-CCNC: 16 U/L (ref 1–41)
ANION GAP SERPL CALCULATED.3IONS-SCNC: 10.2 MMOL/L (ref 5–15)
AST SERPL-CCNC: 21 U/L (ref 1–40)
BASOPHILS # BLD AUTO: 0.05 10*3/MM3 (ref 0–0.2)
BASOPHILS NFR BLD AUTO: 0.9 % (ref 0–1.5)
BILIRUB SERPL-MCNC: 0.4 MG/DL (ref 0–1.2)
BUN SERPL-MCNC: 18 MG/DL (ref 6–20)
BUN/CREAT SERPL: 18.9 (ref 7–25)
CALCIUM SPEC-SCNC: 10 MG/DL (ref 8.6–10.5)
CHLORIDE SERPL-SCNC: 101 MMOL/L (ref 98–107)
CHOLEST SERPL-MCNC: 218 MG/DL (ref 0–200)
CO2 SERPL-SCNC: 26.8 MMOL/L (ref 22–29)
CREAT SERPL-MCNC: 0.95 MG/DL (ref 0.76–1.27)
DEPRECATED RDW RBC AUTO: 38.7 FL (ref 37–54)
EGFRCR SERPLBLD CKD-EPI 2021: 102.5 ML/MIN/1.73
EOSINOPHIL # BLD AUTO: 0.09 10*3/MM3 (ref 0–0.4)
EOSINOPHIL NFR BLD AUTO: 1.6 % (ref 0.3–6.2)
ERYTHROCYTE [DISTWIDTH] IN BLOOD BY AUTOMATED COUNT: 12.5 % (ref 12.3–15.4)
GLOBULIN UR ELPH-MCNC: 2.7 GM/DL
GLUCOSE SERPL-MCNC: 94 MG/DL (ref 65–99)
HBA1C MFR BLD: 5 % (ref 4.8–5.6)
HCT VFR BLD AUTO: 43.1 % (ref 37.5–51)
HCV AB SER DONR QL: NORMAL
HDLC SERPL-MCNC: 57 MG/DL (ref 40–60)
HGB BLD-MCNC: 14.8 G/DL (ref 13–17.7)
IMM GRANULOCYTES # BLD AUTO: 0.02 10*3/MM3 (ref 0–0.05)
IMM GRANULOCYTES NFR BLD AUTO: 0.3 % (ref 0–0.5)
LDLC SERPL CALC-MCNC: 147 MG/DL (ref 0–100)
LDLC/HDLC SERPL: 2.54 {RATIO}
LYMPHOCYTES # BLD AUTO: 1.62 10*3/MM3 (ref 0.7–3.1)
LYMPHOCYTES NFR BLD AUTO: 28.1 % (ref 19.6–45.3)
MCH RBC QN AUTO: 29.7 PG (ref 26.6–33)
MCHC RBC AUTO-ENTMCNC: 34.3 G/DL (ref 31.5–35.7)
MCV RBC AUTO: 86.5 FL (ref 79–97)
MONOCYTES # BLD AUTO: 0.47 10*3/MM3 (ref 0.1–0.9)
MONOCYTES NFR BLD AUTO: 8.1 % (ref 5–12)
NEUTROPHILS NFR BLD AUTO: 3.52 10*3/MM3 (ref 1.7–7)
NEUTROPHILS NFR BLD AUTO: 61 % (ref 42.7–76)
NRBC BLD AUTO-RTO: 0 /100 WBC (ref 0–0.2)
PLATELET # BLD AUTO: 212 10*3/MM3 (ref 140–450)
PMV BLD AUTO: 9.8 FL (ref 6–12)
POTASSIUM SERPL-SCNC: 5.7 MMOL/L (ref 3.5–5.2)
PROT SERPL-MCNC: 7.8 G/DL (ref 6–8.5)
RBC # BLD AUTO: 4.98 10*6/MM3 (ref 4.14–5.8)
SODIUM SERPL-SCNC: 138 MMOL/L (ref 136–145)
TRIGL SERPL-MCNC: 81 MG/DL (ref 0–150)
TSH SERPL DL<=0.05 MIU/L-ACNC: 1.27 UIU/ML (ref 0.27–4.2)
VLDLC SERPL-MCNC: 14 MG/DL (ref 5–40)
WBC NRBC COR # BLD AUTO: 5.77 10*3/MM3 (ref 3.4–10.8)

## 2024-02-15 PROCEDURE — 81003 URINALYSIS AUTO W/O SCOPE: CPT

## 2024-02-15 PROCEDURE — 85025 COMPLETE CBC W/AUTO DIFF WBC: CPT

## 2024-02-15 PROCEDURE — 84443 ASSAY THYROID STIM HORMONE: CPT

## 2024-02-15 PROCEDURE — 80053 COMPREHEN METABOLIC PANEL: CPT

## 2024-02-15 PROCEDURE — 86803 HEPATITIS C AB TEST: CPT

## 2024-02-15 PROCEDURE — 83036 HEMOGLOBIN GLYCOSYLATED A1C: CPT

## 2024-02-15 PROCEDURE — 82306 VITAMIN D 25 HYDROXY: CPT

## 2024-02-15 PROCEDURE — 99396 PREV VISIT EST AGE 40-64: CPT | Performed by: FAMILY MEDICINE

## 2024-02-15 PROCEDURE — 80061 LIPID PANEL: CPT

## 2024-02-15 RX ORDER — HYDROCORTISONE ACETATE PRAMOXINE HCL 1; 1 G/100G; G/100G
CREAM TOPICAL 2 TIMES DAILY PRN
Qty: 28.4 G | Refills: 1 | Status: SHIPPED | OUTPATIENT
Start: 2024-02-15

## 2024-02-15 RX ORDER — ALUMINUM CHLORIDE 20 %
SOLUTION, NON-ORAL TOPICAL NIGHTLY
Qty: 60 ML | Refills: 11 | Status: SHIPPED | OUTPATIENT
Start: 2024-02-15

## 2024-02-15 RX ORDER — WHEAT DEXTRIN/ASPARTAME 3 G/6 G
1 POWDER IN PACKET (EA) ORAL DAILY
Qty: 28 EACH | Refills: 11 | Status: SHIPPED | OUTPATIENT
Start: 2024-02-15

## 2024-02-15 RX ORDER — LISINOPRIL 10 MG/1
10 TABLET ORAL DAILY
Qty: 90 TABLET | Refills: 3 | Status: SHIPPED | OUTPATIENT
Start: 2024-02-15

## 2024-02-15 RX ORDER — FLUOXETINE HYDROCHLORIDE 20 MG/1
20 CAPSULE ORAL DAILY
Qty: 90 CAPSULE | Refills: 3 | Status: SHIPPED | OUTPATIENT
Start: 2024-02-15

## 2024-02-16 LAB
BILIRUB UR QL STRIP: NEGATIVE
CLARITY UR: CLEAR
COLOR UR: YELLOW
GLUCOSE UR STRIP-MCNC: NEGATIVE MG/DL
HGB UR QL STRIP.AUTO: NEGATIVE
KETONES UR QL STRIP: NEGATIVE
LEUKOCYTE ESTERASE UR QL STRIP.AUTO: NEGATIVE
NITRITE UR QL STRIP: NEGATIVE
PH UR STRIP.AUTO: 6.5 [PH] (ref 5–8)
PROT UR QL STRIP: NEGATIVE
SP GR UR STRIP: 1.01 (ref 1–1.03)
UROBILINOGEN UR QL STRIP: NORMAL

## 2024-03-08 NOTE — PROGRESS NOTES
[facility]  Cardiology Consultation Note     Leon Carnes  1980  Requesting Provider: South Hollingsworth DO  PCP: South Hollingsworth DO    ID:  Leon Carnes is a 44 y.o. male who resides in [place], [place].     REASON FOR CONSULTATION:    Family history of hypertrophic cardiomyopathy  History of SVT status post RFA         Dear Dr. Hollingsworth:    Leon Carnes returns to my office for follow-up.  I last saw him in 2019 at which time he underwent cardiac catheterization showing normal coronary arteries.  His father has hypertrophic cardiomyopathy.  Previous evaluations with echo and cardiac MR in the past have showed no evidence of hypertrophic myocardium for Leon.    He is physically active, exercises and does not smoke.      Past Medical H  hadistory, Past Surgical History, Family history, Social History, and Medications were all reviewed with the patient today and updated as necessary.       Current Outpatient Medications:     aluminum chloride (Drysol) 20 % external solution, Apply  topically to the appropriate area as directed Every Night., Disp: 60 mL, Rfl: 11    FLUoxetine (PROzac) 20 MG capsule, Take 1 capsule by mouth Daily., Disp: 90 capsule, Rfl: 3    fluticasone (FLONASE) 50 MCG/ACT nasal spray, 1 spray into the nostril(s) as directed by provider Daily As Needed for Allergies., Disp: 1 bottle, Rfl: 11    Hydrocortisone Ace-Pramoxine 1-1 % rectal cream, Insert  into the rectum 2 (Two) Times a Day As Needed for Hemorrhoids., Disp: 28.4 g, Rfl: 1    lisinopril (PRINIVIL,ZESTRIL) 10 MG tablet, Take 1 tablet by mouth Daily., Disp: 90 tablet, Rfl: 3    Wheat Dextrin (Benefiber Drink Mix) pack, Take 1 each by mouth Daily., Disp: 28 each, Rfl: 11    No Known Allergies      Past Medical History:   Diagnosis Date    Acute idiopathic myocarditis 1/19/2019    · [facility] admission for chest pain and elevated troponin (peak 3), 1/19/2019 · Echo (1/19/2019):  LVEF >70%.   No  "valvular abnormalities.  Trivial pericardial effusion. · Cardiac catheterization (19): Normal coronary arteries. Normal LVEF.    Allergic rhinitis Lifelong    Anxiety 2015    Recurrent anxiety and distress at home    Anxiety     AV yudi re-entry tachycardia 2011    RFA - successful    Closed left ankle fracture 1988    Costochondritis     Hyperlipidemia 2016    Cholesterol 134    Prehypertension 2016    Average blood pressure 125/85       Past Surgical History:   Procedure Laterality Date    CARDIAC CATHETERIZATION N/A 2019    Procedure: Left Heart Cath;  Surgeon: Filiberto Miner IV, MD;  Location: [facility] INVASIVE LOCATION;  Service: Cardiovascular    RADIOFREQUENCY ABLATION  2011    AV nod tach       Family History   Problem Relation Age of Onset    No Known Problems Mother     Hypertrophic cardiomyopathy Father          age 72    Hypertension Father     Heart disease Maternal Grandfather        Social History     Tobacco Use    Smoking status: Never     Passive exposure: Never    Smokeless tobacco: Never   Substance Use Topics    Alcohol use: Yes     Alcohol/week: 2.0 standard drinks of alcohol     Types: 2 Glasses of wine per week     Comment: social       Review of Systems   Constitutional: Negative for malaise/fatigue.   Eyes:  Negative for vision loss in left eye and vision loss in right eye.   Cardiovascular:  Negative for chest pain, dyspnea on exertion, near-syncope, orthopnea, palpitations, paroxysmal nocturnal dyspnea and syncope.   Musculoskeletal:  Negative for myalgias.   Neurological:  Negative for brief paralysis, excessive daytime sleepiness, focal weakness, numbness, paresthesias and weakness.   All other systems reviewed and are negative.              /74 (BP Location: Left arm, Patient Position: Sitting, Cuff Size: Adult)   Pulse 58   Ht 182.9 cm (72\")   Wt 88.7 kg (195 lb 9.6 oz)   SpO2 99%   BMI 26.53 kg/m²        Constitutional:       Appearance: " Healthy appearance. Well-developed.   Eyes:      General: Lids are normal. No scleral icterus.     Conjunctiva/sclera: Conjunctivae normal.   HENT:      Head: Normocephalic and atraumatic.   Neck:      Thyroid: No thyroid mass or thyromegaly.      Vascular: No carotid bruit or JVD. JVD normal.   Pulmonary:      Effort: Pulmonary effort is normal.      Breath sounds: Normal breath sounds. No wheezing. No rhonchi. No rales.   Cardiovascular:      Normal rate. Regular rhythm.      Murmurs: There is no murmur.      No gallop.  No rub.   Pulses:     Intact distal pulses.   Edema:     Peripheral edema absent.   Abdominal:      General: There is no distension.      Palpations: Abdomen is soft. There is no abdominal mass.   Musculoskeletal:      Cervical back: Normal range of motion. Skin:     General: Skin is warm and dry. There is no cyanosis.      Findings: No rash.   Neurological:      General: No focal deficit present.      Mental Status: Alert and oriented to person, place, and time.      Gait: Gait is intact.   Psychiatric:         Attention and Perception: Attention normal.         Mood and Affect: Mood normal.         Behavior: Behavior normal.             ECG 12 Lead    Date/Time: 1/21/2019 10:30 AM  Performed by: Filiberto Miner IV, MD    Authorized by: Filiberto Miner IV, MD  Comparison: compared with previous ECG   Similar to previous ECG  Rhythm: sinus bradycardia  BPM: 58          Lab Results   Component Value Date    CHOL 218 (H) 02/15/2024    HDL 57 02/15/2024     (H) 02/15/2024    VLDL 14 02/15/2024     Lab Results   Component Value Date    GLUCOSE 94 02/15/2024    BUN 18 02/15/2024    CREATININE 0.95 02/15/2024    EGFR 102.5 02/15/2024    BCR 18.9 02/15/2024    K 5.7 (H) 02/15/2024    CO2 26.8 02/15/2024    CALCIUM 10.0 02/15/2024    ALBUMIN 5.1 02/15/2024    BILITOT 0.4 02/15/2024    AST 21 02/15/2024    ALT 16 02/15/2024     Lab Results   Component Value Date    WBC 5.77  02/15/2024    HGB 14.8 02/15/2024    HCT 43.1 02/15/2024    MCV 86.5 02/15/2024     02/15/2024     Lab Results   Component Value Date    HGBA1C 5.00 02/15/2024            Diagnoses and all orders for this visit:    1. Family history of hypertrophic cardiomyopathy (Primary)  Overview:  Father with hypertrophic cardiomyopathy  Cardiac MRI (2011): Normal IV septal thickness (11 mm)   Echo (2/17/14): Normal LVEF.  Normal functioning valves. No evidence of hypertrophic cardiomyopathy    Assessment & Plan:  Repeat echo    Orders:  -     Adult Transthoracic Echo Complete W/ Cont if Necessary Per Protocol; Future    2. 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        BROCK Miner MD, FACC, [facility]  Interventional Cardiology  03/12/24  10:30 EDT

## 2024-03-12 ENCOUNTER — OFFICE VISIT (OUTPATIENT)
Dept: CARDIOLOGY | Facility: CLINIC | Age: 44
End: 2024-03-12
Payer: COMMERCIAL

## 2024-03-12 VITALS
BODY MASS INDEX: 26.49 KG/M2 | SYSTOLIC BLOOD PRESSURE: 128 MMHG | HEIGHT: 72 IN | DIASTOLIC BLOOD PRESSURE: 74 MMHG | WEIGHT: 195.6 LBS | OXYGEN SATURATION: 99 % | HEART RATE: 58 BPM

## 2024-03-12 DIAGNOSIS — Z82.49 FAMILY HISTORY OF HYPERTROPHIC CARDIOMYOPATHY: Primary | ICD-10-CM

## 2024-03-12 DIAGNOSIS — I47.10 SVT (SUPRAVENTRICULAR TACHYCARDIA): ICD-10-CM

## 2024-03-12 PROCEDURE — 99204 OFFICE O/P NEW MOD 45 MIN: CPT | Performed by: INTERNAL MEDICINE

## 2024-03-12 PROCEDURE — 93000 ELECTROCARDIOGRAM COMPLETE: CPT | Performed by: INTERNAL MEDICINE

## 2024-03-20 ENCOUNTER — HOSPITAL ENCOUNTER (OUTPATIENT)
Dept: CARDIOLOGY | Facility: HOSPITAL | Age: 44
Discharge: HOME | End: 2024-03-20
Admitting: INTERNAL MEDICINE
Payer: COMMERCIAL

## 2024-03-20 VITALS — BODY MASS INDEX: 26.49 KG/M2 | WEIGHT: 195.55 LBS | HEIGHT: 72 IN

## 2024-03-20 DIAGNOSIS — Z82.49 FAMILY HISTORY OF HYPERTROPHIC CARDIOMYOPATHY: ICD-10-CM

## 2024-03-20 LAB
ASCENDING AORTA: 3.9 CM
BH CV ECHO MEAS - AO MAX PG: 5.8 MMHG
BH CV ECHO MEAS - AO MEAN PG: 4 MMHG
BH CV ECHO MEAS - AO ROOT DIAM: 4.1 CM
BH CV ECHO MEAS - AO V2 MAX: 120 CM/SEC
BH CV ECHO MEAS - AO V2 VTI: 27.4 CM
BH CV ECHO MEAS - AVA(I,D): 2.8 CM2
BH CV ECHO MEAS - EDV(CUBED): 114.8 ML
BH CV ECHO MEAS - EDV(MOD-SP2): 109 ML
BH CV ECHO MEAS - EDV(MOD-SP4): 120 ML
BH CV ECHO MEAS - EF(MOD-BP): 56 %
BH CV ECHO MEAS - EF(MOD-SP2): 60.6 %
BH CV ECHO MEAS - EF(MOD-SP4): 50.8 %
BH CV ECHO MEAS - ESV(CUBED): 41.4 ML
BH CV ECHO MEAS - ESV(MOD-SP2): 43 ML
BH CV ECHO MEAS - ESV(MOD-SP4): 59 ML
BH CV ECHO MEAS - FS: 28.8 %
BH CV ECHO MEAS - IVS/LVPW: 0.97 CM
BH CV ECHO MEAS - IVSD: 0.96 CM
BH CV ECHO MEAS - LA DIMENSION: 3.3 CM
BH CV ECHO MEAS - LAT PEAK E' VEL: 16.8 CM/SEC
BH CV ECHO MEAS - LV DIASTOLIC VOL/BSA (35-75): 56.9 CM2
BH CV ECHO MEAS - LV MASS(C)D: 168.2 GRAMS
BH CV ECHO MEAS - LV MAX PG: 4.4 MMHG
BH CV ECHO MEAS - LV MEAN PG: 3 MMHG
BH CV ECHO MEAS - LV SYSTOLIC VOL/BSA (12-30): 28 CM2
BH CV ECHO MEAS - LV V1 MAX: 105 CM/SEC
BH CV ECHO MEAS - LV V1 VTI: 24.6 CM
BH CV ECHO MEAS - LVIDD: 4.9 CM
BH CV ECHO MEAS - LVIDS: 3.5 CM
BH CV ECHO MEAS - LVOT AREA: 3.1 CM2
BH CV ECHO MEAS - LVOT DIAM: 2 CM
BH CV ECHO MEAS - LVPWD: 0.99 CM
BH CV ECHO MEAS - MED PEAK E' VEL: 8.55 CM/SEC
BH CV ECHO MEAS - MV A MAX VEL: 38.4 CM/SEC
BH CV ECHO MEAS - MV DEC SLOPE: 330 CM/SEC2
BH CV ECHO MEAS - MV DEC TIME: 0.23 SEC
BH CV ECHO MEAS - MV E MAX VEL: 59.6 CM/SEC
BH CV ECHO MEAS - MV E/A: 1.55
BH CV ECHO MEAS - MV MAX PG: 2.4 MMHG
BH CV ECHO MEAS - MV MEAN PG: 1 MMHG
BH CV ECHO MEAS - MV P1/2T: 73 MSEC
BH CV ECHO MEAS - MV V2 VTI: 27.4 CM
BH CV ECHO MEAS - MVA(P1/2T): 3 CM2
BH CV ECHO MEAS - MVA(VTI): 2.8 CM2
BH CV ECHO MEAS - PA ACC SLOPE: 430 CM/SEC2
BH CV ECHO MEAS - PA ACC TIME: 0.11 SEC
BH CV ECHO MEAS - RAP SYSTOLE: 3 MMHG
BH CV ECHO MEAS - RVSP: 17 MMHG
BH CV ECHO MEAS - SI(MOD-SP2): 31.3 ML/M2
BH CV ECHO MEAS - SI(MOD-SP4): 28.9 ML/M2
BH CV ECHO MEAS - SV(LVOT): 77.3 ML
BH CV ECHO MEAS - SV(MOD-SP2): 66 ML
BH CV ECHO MEAS - SV(MOD-SP4): 61 ML
BH CV ECHO MEAS - TAPSE (>1.6): 2 CM
BH CV ECHO MEAS - TR MAX PG: 14.4 MMHG
BH CV ECHO MEAS - TR MAX VEL: 189.3 CM/SEC
BH CV ECHO MEASUREMENTS AVERAGE E/E' RATIO: 4.7
BH CV VAS BP RIGHT ARM: NORMAL MMHG
BH CV XLRA - RV BASE: 3.7 CM
BH CV XLRA - RV LENGTH: 8 CM
BH CV XLRA - RV MID: 2.6 CM
BH CV XLRA - TDI S': 9.78 CM/SEC
LEFT ATRIUM VOLUME INDEX: 24.6 ML/M2
LV EF 2D ECHO EST: 55 %

## 2024-03-20 PROCEDURE — 93306 TTE W/DOPPLER COMPLETE: CPT

## 2024-06-05 DIAGNOSIS — I10 PRIMARY HYPERTENSION: Chronic | ICD-10-CM

## 2024-06-05 DIAGNOSIS — F41.9 ANXIETY: ICD-10-CM

## 2024-06-06 RX ORDER — FLUOXETINE HYDROCHLORIDE 20 MG/1
20 CAPSULE ORAL DAILY
Qty: 90 CAPSULE | Refills: 3 | Status: SHIPPED | OUTPATIENT
Start: 2024-06-06

## 2024-06-06 RX ORDER — LISINOPRIL 10 MG/1
10 TABLET ORAL DAILY
Qty: 90 TABLET | Refills: 3 | Status: SHIPPED | OUTPATIENT
Start: 2024-06-06

## 2024-06-06 NOTE — TELEPHONE ENCOUNTER
Rx Refill Note  Requested Prescriptions     Pending Prescriptions Disp Refills    FLUoxetine (PROzac) 20 MG capsule [Pharmacy Med Name: FLUoxetine HCL 20 MG CAPSULE] 90 capsule 3     Sig: TAKE 1 CAPSULE BY MOUTH DAILY    lisinopril (PRINIVIL,ZESTRIL) 10 MG tablet [Pharmacy Med Name: LISINOPRIL 10 MG TABLET] 90 tablet 3     Sig: TAKE 1 TABLET BY MOUTH DAILY      Last office visit with prescribing clinician: 2/15/2024   Last telemedicine visit with prescribing clinician: Visit date not found   Next office visit with prescribing clinician: 2/17/2025     Evelyn Joseph MA  06/06/24, 10:02 EDT

## 2025-02-17 ENCOUNTER — OFFICE VISIT (OUTPATIENT)
Dept: FAMILY MEDICINE CLINIC | Facility: CLINIC | Age: 45
End: 2025-02-17
Payer: COMMERCIAL

## 2025-02-17 VITALS
BODY MASS INDEX: 27.04 KG/M2 | SYSTOLIC BLOOD PRESSURE: 118 MMHG | WEIGHT: 199.6 LBS | OXYGEN SATURATION: 98 % | HEIGHT: 72 IN | DIASTOLIC BLOOD PRESSURE: 78 MMHG | HEART RATE: 64 BPM

## 2025-02-17 DIAGNOSIS — E78.00 PURE HYPERCHOLESTEROLEMIA: ICD-10-CM

## 2025-02-17 DIAGNOSIS — F41.9 ANXIETY: ICD-10-CM

## 2025-02-17 DIAGNOSIS — Z00.00 PREVENTATIVE HEALTH CARE: Primary | ICD-10-CM

## 2025-02-17 DIAGNOSIS — I47.10 SVT (SUPRAVENTRICULAR TACHYCARDIA): ICD-10-CM

## 2025-02-17 DIAGNOSIS — Z12.83 SKIN CANCER SCREENING: ICD-10-CM

## 2025-02-17 DIAGNOSIS — Z82.49 FAMILY HISTORY OF HYPERTROPHIC CARDIOMYOPATHY: ICD-10-CM

## 2025-02-17 DIAGNOSIS — E55.9 VITAMIN D DEFICIENCY: ICD-10-CM

## 2025-02-17 DIAGNOSIS — I10 PRIMARY HYPERTENSION: Chronic | ICD-10-CM

## 2025-02-17 DIAGNOSIS — Z23 IMMUNIZATION DUE: ICD-10-CM

## 2025-02-17 PROCEDURE — 90471 IMMUNIZATION ADMIN: CPT | Performed by: FAMILY MEDICINE

## 2025-02-17 PROCEDURE — 99396 PREV VISIT EST AGE 40-64: CPT | Performed by: FAMILY MEDICINE

## 2025-02-17 PROCEDURE — 90656 IIV3 VACC NO PRSV 0.5 ML IM: CPT | Performed by: FAMILY MEDICINE

## 2025-02-17 RX ORDER — LISINOPRIL 10 MG/1
10 TABLET ORAL DAILY
Qty: 90 TABLET | Refills: 3 | Status: SHIPPED | OUTPATIENT
Start: 2025-02-17

## 2025-02-17 NOTE — ASSESSMENT & PLAN NOTE
Lab Results   Component Value Date    CHOL 218 (H) 02/15/2024    TRIG 81 02/15/2024    HDL 57 02/15/2024     (H) 02/15/2024   Has ranged from LDL 130s to 170s. Will check LpA for overall risk, if skewing higher LDL discussed early statin initiation, he is open to this.

## 2025-02-17 NOTE — ASSESSMENT & PLAN NOTE
No current supplement, recheck 25, OH Vit D today. If <30 would recommend 2000IU daily in winter months

## 2025-02-17 NOTE — PROGRESS NOTES
Annual Well Adult Visit     Patient Name: Leon Carnes  : 1980   MRN: [id number]   Care Team: Patient Care Team:  South Hollingsworth DO as PCP - General (Family Medicine)    Chief Complaint:    Chief Complaint   Patient presents with    Annual Exam       History of Present Illness: Leon Carnes is a 45 y.o. male who presents today for annual physical exam and preventative care.    HPI    No significant changes. Had his echo for heart monitoring last March, no issues. Sees cardiology yearly.    The following portions of the patient's history were reviewed and updated as appropriate: allergies, current medications, past family history, past medical history, past social history, past surgical history and problem list.       Allied Screenings    N/A         Date      Eye Exam       []              []   Up to date    Location:   [x]   Recommended       Counseled every 2 years in those without known issues, yearly if wearing glasses or contacts      Dental Exam       []           [x]   Up to date   Location:   []   Recommended       Counseled, recommended cleanings every 6 months, daily brushing and flossing        Skin Cancer Screening        []            []   Up to date   Location:   [x]   Recommended Counseled on regular sunscreen wear, self-skin checks      Obesity Counseling        [x]        []   Complete   []   Nutritionist referral   []   Declined Counseled on moderate portions, low meat diet focusing on whole foods and plant-based protein          Additional Testing         Date      Colorectal Screening        [x]   N/A   []   Ordered today   []   Complete        Date:     Where:         PSA  (Over age 50)     [x]   N/A   []   Ordered today   []   Complete    Date:     Where:      US Aorta  (For male with >20 cigarette history, age 65)     [x]   N/A   []   Ordered today   []   Complete    Date:     Where:      CT for Smoker  (Age 55-75, 30 pk yr)     [x]   N/A   []   Ordered today   []    Complete    Date:     Where:      Bone Density/DEXA        [x]   N/A   []   Ordered today   []   Complete    Date:     Follow-up:      Hep. C     []   N/A   []   Ordered today   [x]   Complete         Subjective      Review of Systems:   Review of Systems - See HPI    Past Medical History:   Past Medical History:   Diagnosis Date    Acute idiopathic myocarditis 2019    · [facility] admission for chest pain and elevated troponin (peak 3), 2019 · Echo (2019):  LVEF >70%.   No valvular abnormalities.  Trivial pericardial effusion. · Cardiac catheterization (19): Normal coronary arteries. Normal LVEF.    Allergic rhinitis Lifelong    Anxiety     Recurrent anxiety and distress at home    Anxiety     AV yudi re-entry tachycardia     RFA - successful    Closed left ankle fracture     Costochondritis     Hyperlipidemia 2016    Cholesterol 134    Prehypertension 2016    Average blood pressure 125/85       Past Surgical History:   Past Surgical History:   Procedure Laterality Date    CARDIAC CATHETERIZATION N/A 2019    Procedure: Left Heart Cath;  Surgeon: Filiberto Miner IV, MD;  Location: [facility] CATH INVASIVE LOCATION;  Service: Cardiovascular    RADIOFREQUENCY ABLATION      AV nod tach       Family History:   Family History   Problem Relation Age of Onset    No Known Problems Mother     Hypertrophic cardiomyopathy Father          age 72    Hypertension Father     Heart disease Maternal Grandfather        Social History:   Social History     Socioeconomic History    Marital status:    Tobacco Use    Smoking status: Never     Passive exposure: Never    Smokeless tobacco: Never   Vaping Use    Vaping status: Never Used   Substance and Sexual Activity    Alcohol use: Yes     Alcohol/week: 2.0 standard drinks of alcohol     Types: 2 Glasses of wine per week     Comment: social    Drug use: No    Sexual activity: Yes     Partners: Female       Social History     Social  History Narrative    Domestic life : Lives in private home with wife and 2 children, in-laws live in basement        [place]ai: Buddhism, attends monthly        Sleep hygiene: In bed 10 PM to 6 AM for 8 hours of sleep, occasional melatonin        Caffeine use: 1 or 2 cups of coffee daily        Exercise habits: Walking 3 miles 4 days weekly         Diet:  Low-calorie, American Heart Association diet - low in salt - low in carbohydrates         Alcohol: < 1 per week        Bowel habits: 2x per day, no regular supplements        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        Hearing: No impairment        Vision: No impairment        Driving: No limitation        Tobacco History:   Social History     Tobacco Use   Smoking Status Never    Passive exposure: Never   Smokeless Tobacco Never       Medications:     Current Outpatient Medications:     aluminum chloride (Drysol) 20 % external solution, Apply  topically to the appropriate area as directed Every Night., Disp: 60 mL, Rfl: 11    FLUoxetine (PROzac) 20 MG capsule, Take 1 capsule by mouth Daily., Disp: 90 capsule, Rfl: 3    fluticasone (FLONASE) 50 MCG/ACT nasal spray, 1 spray into the nostril(s) as directed by provider Daily As Needed for Allergies., Disp: 1 bottle, Rfl: 11    Hydrocortisone Ace-Pramoxine 1-1 % rectal cream, Insert  into the rectum 2 (Two) Times a Day As Needed for Hemorrhoids., Disp: 28.4 g, Rfl: 1    lisinopril (PRINIVIL,ZESTRIL) 10 MG tablet, Take 1 tablet by mouth Daily., Disp: 90 tablet, Rfl: 3    Wheat Dextrin (Benefiber Drink Mix) pack, Take 1 each by mouth Daily. (Patient not taking: Reported on 2/17/2025), Disp: 28 each, Rfl: 11    Immunizations:   Immunization History   Administered Date(s) Administered    COVID-19 (MODERNA) 1st,2nd,3rd Dose Monovalent 02/13/2021, 03/13/2021, 11/02/2021    FluMist 2-49yrs 10/01/2017    Flublok 18+yrs 11/02/2021    Fluzone  >6mos 02/17/2025    Fluzone (or Fluarix & Flulaval for VFC) >6mos  "09/21/2017, 10/08/2018, 10/25/2022    Hep B, Adolescent or Pediatric 03/03/1999, 04/19/1999, 09/22/1999    Influenza, Unspecified 10/08/2018    Tdap 03/08/2018    influenza Split 12/06/2016        Allergies:   No Known Allergies    Objective   Objective     Physical Exam:  Vital Signs:   Vitals:    02/17/25 0810   BP: 118/78   Pulse: 64   SpO2: 98%   Weight: 90.5 kg (199 lb 9.6 oz)   Height: 182.9 cm (72.01\")     Body mass index is 27.06 kg/m².     Physical Exam  Vitals and nursing note reviewed.   Constitutional:       General: He is not in acute distress.     Appearance: He is well-developed.   HENT:      Head: Normocephalic and atraumatic.      Right Ear: External ear normal.      Left Ear: External ear normal.   Eyes:      Conjunctiva/sclera: Conjunctivae normal.      Pupils: Pupils are equal, round, and reactive to light.   Neck:      Thyroid: No thyromegaly.      Vascular: No JVD.      Trachea: No tracheal deviation.   Cardiovascular:      Rate and Rhythm: Normal rate and regular rhythm.      Heart sounds: Normal heart sounds. No murmur heard.  Pulmonary:      Effort: Pulmonary effort is normal.      Breath sounds: Normal breath sounds.   Abdominal:      General: Bowel sounds are normal. There is no distension.      Palpations: Abdomen is soft. There is no mass.      Tenderness: There is no abdominal tenderness. There is no guarding or rebound.   Musculoskeletal:      Cervical back: Normal range of motion and neck supple.   Lymphadenopathy:      Cervical: No cervical adenopathy.   Skin:     General: Skin is warm and dry.      Capillary Refill: Capillary refill takes less than 2 seconds.   Neurological:      Mental Status: He is alert and oriented to person, place, and time.      Cranial Nerves: No cranial nerve deficit.   Psychiatric:         Behavior: Behavior normal.       Nursing note reviewed  PHQ-2 Depression Screening  Little interest or pleasure in doing things?     Feeling down, depressed, or hopeless? "     PHQ-2 Total Score       Procedures/Radiology     Procedures  No radiology results for the last 7 days     Assessment & Plan   Assessment / Plan      Assessment/Plan:   Problems Addressed This Visit  Diagnoses and all orders for this visit:    1. Preventative health care (Primary)    2. Vitamin D deficiency  Assessment & Plan:  No current supplement, recheck 25, OH Vit D today. If <30 would recommend 2000IU daily in winter months    Orders:  -     Vitamin D,25-Hydroxy; Future    3. Pure hypercholesterolemia  Assessment & Plan:  Lab Results   Component Value Date    CHOL 218 (H) 02/15/2024    TRIG 81 02/15/2024    HDL 57 02/15/2024     (H) 02/15/2024   Has ranged from LDL 130s to 170s. Will check LpA for overall risk, if skewing higher LDL discussed early statin initiation, he is open to this.     Orders:  -     Lipid Panel; Future  -     Lipoprotein A (LPA); Future  -     Comprehensive Metabolic Panel; Future    4. Family history of hypertrophic cardiomyopathy    5. Immunization due  -     Fluzone >6mos (1545-3058)    6. Skin cancer screening  -     Ambulatory Referral to Dermatology    7. Primary hypertension  Assessment & Plan:  Doing well, at goal    Orders:  -     lisinopril (PRINIVIL,ZESTRIL) 10 MG tablet; Take 1 tablet by mouth Daily.  Dispense: 90 tablet; Refill: 3    8. History of SVT   Assessment & Plan:  Asymptomatic      9. Anxiety  Assessment & Plan:  He has had some slight anorgasmia from the medication previously, but not enough to want to change meds or adjust dosage presently. He will let us know if he wants to make any changes, otherwise refill sent in for fluoxetine 20mg for 1 year.  -Advised him to avoid excess anticholinergics    Orders:  -     FLUoxetine (PROzac) 20 MG capsule; Take 1 capsule by mouth Daily.  Dispense: 90 capsule; Refill: 3      Problem List Items Addressed This Visit          Cardiac and Vasculature    Hypertension (Chronic)    Overview     Longstanding  pre-hypertension, newly started on lisinopril 10 mg daily in October 2022         Current Assessment & Plan     Doing well, at goal         Relevant Medications    lisinopril (PRINIVIL,ZESTRIL) 10 MG tablet    Pure hypercholesterolemia (Chronic)    Current Assessment & Plan     Lab Results   Component Value Date    CHOL 218 (H) 02/15/2024    TRIG 81 02/15/2024    HDL 57 02/15/2024     (H) 02/15/2024   Has ranged from LDL 130s to 170s. Will check LpA for overall risk, if skewing higher LDL discussed early statin initiation, he is open to this.          Relevant Orders    Lipid Panel    Lipoprotein A (LPA)    Comprehensive Metabolic Panel    History of SVT     Overview     History of AV node reentrant tachycardia status post radiofrequency ablation by Mahendra Jean, May 2001         Current Assessment & Plan     Asymptomatic            Endocrine and Metabolic    Vitamin D deficiency    Current Assessment & Plan     No current supplement, recheck 25, OH Vit D today. If <30 would recommend 2000IU daily in winter months         Relevant Orders    Vitamin D,25-Hydroxy       Family History    Family history of hypertrophic cardiomyopathy    Overview     Father with hypertrophic cardiomyopathy  Cardiac MRI (2011): Normal IV septal thickness (11 mm)   Echo (2/17/14): Normal LVEF.  Normal functioning valves. No evidence of hypertrophic cardiomyopathy            Mental Health    Anxiety    Overview     Chronic, on fluoxetine 20 mg daily with good results         Current Assessment & Plan     He has had some slight anorgasmia from the medication previously, but not enough to want to change meds or adjust dosage presently. He will let us know if he wants to make any changes, otherwise refill sent in for fluoxetine 20mg for 1 year.  -Advised him to avoid excess anticholinergics         Relevant Medications    FLUoxetine (PROzac) 20 MG capsule     Other Visit Diagnoses       Preventative health care    -  Primary     Immunization due        Relevant Orders    Fluzone >6mos (7096-6076) (Completed)    Skin cancer screening        Relevant Orders    Ambulatory Referral to Dermatology (Completed)            See patient diagnoses and orders along with patient instructions for assessment, plan, and changes to care for patient.    The preventative exam has been reviewed in detail.  The patient has been fully counseled on preventative guidelines for vaccines, cancer screenings, and other health maintenance needs. The patient was counseled on maintaining a lifestyle to promote good health and to minimize chronic diseases.  The patient has been assisted with scheduling healthcare procedures for the coming year and given a written document outlining these recommendations. Age-appropriate screening measures have been ordered for the patient today as indicated above.    There are no Patient Instructions on file for this visit.    Follow Up:   Return in about 1 year (around 2/17/2026) for Annual.    DO MAINOR Gomez RD  [facility]  [address]  [facility] [phone]  Fax [phone]  Phone [phone]

## 2025-02-17 NOTE — ASSESSMENT & PLAN NOTE
He has had some slight anorgasmia from the medication previously, but not enough to want to change meds or adjust dosage presently. He will let us know if he wants to make any changes, otherwise refill sent in for fluoxetine 20mg for 1 year.  -Advised him to avoid excess anticholinergics

## (undated) DEVICE — MODEL AT P65, P/N 701554-001KIT CONTENTS: HAND CONTROLLER, 3-WAY HIGH-PRESSURE STOPCOCK WITH ROTATING END AND PREMIUM HIGH-PRESSURE TUBING: Brand: ANGIOTOUCH® KIT

## (undated) DEVICE — DEV COMP RAD PRELUDESYNC 24CM

## (undated) DEVICE — CATH DIAG EXPO .056 FL3.5 6F 100CM

## (undated) DEVICE — Device

## (undated) DEVICE — INTRO SHEATH PRELUDE IDEAL SPRNG COIL 021 6F 23X80CM

## (undated) DEVICE — GUIDE CATHETER: Brand: MACH1™

## (undated) DEVICE — CATH DIAG EXPO M/ PK 6FR FL4/FR4 PIG 3PK

## (undated) DEVICE — PK CATH CARD 10

## (undated) DEVICE — MODEL BT2000 P/N 700287-012KIT CONTENTS: MANIFOLD WITH SALINE AND CONTRAST PORTS, SALINE TUBING WITH SPIKE AND HAND SYRINGE, TRANSDUCER: Brand: BT2000 AUTOMATED MANIFOLD KIT